# Patient Record
Sex: MALE
[De-identification: names, ages, dates, MRNs, and addresses within clinical notes are randomized per-mention and may not be internally consistent; named-entity substitution may affect disease eponyms.]

---

## 2017-09-06 ENCOUNTER — HOSPITAL ENCOUNTER (OUTPATIENT)
Dept: HOSPITAL 14 - H.ER | Age: 57
Setting detail: OBSERVATION
LOS: 2 days | Discharge: HOME | End: 2017-09-08
Attending: INTERNAL MEDICINE | Admitting: INTERNAL MEDICINE
Payer: COMMERCIAL

## 2017-09-06 DIAGNOSIS — Z85.841: ICD-10-CM

## 2017-09-06 DIAGNOSIS — Z79.899: ICD-10-CM

## 2017-09-06 DIAGNOSIS — G40.909: ICD-10-CM

## 2017-09-06 DIAGNOSIS — I10: ICD-10-CM

## 2017-09-06 DIAGNOSIS — E78.5: ICD-10-CM

## 2017-09-06 DIAGNOSIS — Z90.49: ICD-10-CM

## 2017-09-06 DIAGNOSIS — D49.6: ICD-10-CM

## 2017-09-06 DIAGNOSIS — Z85.038: ICD-10-CM

## 2017-09-06 DIAGNOSIS — C78.7: ICD-10-CM

## 2017-09-06 DIAGNOSIS — K56.5: Primary | ICD-10-CM

## 2017-09-06 LAB
BASOPHILS # BLD AUTO: 0.1 K/UL (ref 0–0.2)
BASOPHILS NFR BLD: 0.7 % (ref 0–2)
EOSINOPHIL # BLD AUTO: 0.1 K/UL (ref 0–0.7)
EOSINOPHIL NFR BLD: 0.7 % (ref 0–4)
ERYTHROCYTE [DISTWIDTH] IN BLOOD BY AUTOMATED COUNT: 17.5 % (ref 11.5–14.5)
HCT VFR BLD CALC: 48 % (ref 35–51)
LYMPHOCYTES # BLD AUTO: 1.2 K/UL (ref 1–4.3)
LYMPHOCYTES NFR BLD AUTO: 9.9 % (ref 20–40)
MCH RBC QN AUTO: 28 PG (ref 27–31)
MCHC RBC AUTO-ENTMCNC: 32.8 G/DL (ref 33–37)
MCV RBC AUTO: 85.3 FL (ref 80–94)
MONOCYTES # BLD: 0.9 K/UL (ref 0–0.8)
MONOCYTES NFR BLD: 7 % (ref 0–10)
NEUTROPHILS # BLD: 10 K/UL (ref 1.8–7)
NEUTROPHILS NFR BLD AUTO: 81.7 % (ref 50–75)
NRBC BLD AUTO-RTO: 0 % (ref 0–0)
PLATELET # BLD: 194 K/UL (ref 130–400)
PMV BLD AUTO: 9.1 FL (ref 7.2–11.7)
WBC # BLD AUTO: 12.3 K/UL (ref 4.8–10.8)

## 2017-09-06 PROCEDURE — 85027 COMPLETE CBC AUTOMATED: CPT

## 2017-09-06 PROCEDURE — 96360 HYDRATION IV INFUSION INIT: CPT

## 2017-09-06 PROCEDURE — 81003 URINALYSIS AUTO W/O SCOPE: CPT

## 2017-09-06 PROCEDURE — 85730 THROMBOPLASTIN TIME PARTIAL: CPT

## 2017-09-06 PROCEDURE — 85025 COMPLETE CBC W/AUTO DIFF WBC: CPT

## 2017-09-06 PROCEDURE — 99285 EMERGENCY DEPT VISIT HI MDM: CPT

## 2017-09-06 PROCEDURE — 74000: CPT

## 2017-09-06 PROCEDURE — 71010: CPT

## 2017-09-06 PROCEDURE — 36415 COLL VENOUS BLD VENIPUNCTURE: CPT

## 2017-09-06 PROCEDURE — 96374 THER/PROPH/DIAG INJ IV PUSH: CPT

## 2017-09-06 PROCEDURE — 96361 HYDRATE IV INFUSION ADD-ON: CPT

## 2017-09-06 PROCEDURE — 74177 CT ABD & PELVIS W/CONTRAST: CPT

## 2017-09-06 PROCEDURE — 83690 ASSAY OF LIPASE: CPT

## 2017-09-06 PROCEDURE — 93005 ELECTROCARDIOGRAM TRACING: CPT

## 2017-09-06 PROCEDURE — 80053 COMPREHEN METABOLIC PANEL: CPT

## 2017-09-06 PROCEDURE — 96376 TX/PRO/DX INJ SAME DRUG ADON: CPT

## 2017-09-06 PROCEDURE — 85610 PROTHROMBIN TIME: CPT

## 2017-09-06 NOTE — ED PDOC
HPI: Abdomen


Time Seen by Provider: 09/06/17 23:25


Chief Complaint (Nursing): Abdominal Pain


Chief Complaint (Provider): Abdominal pain


History Per: Patient


History/Exam Limitations: no limitations


Onset/Duration Of Symptoms: Days (1)


Outside of [place] travel?: No


Current Symptoms Are (Timing): Still Present


Location Of Pain/Discomfort: Diffuse


Quality Of Discomfort: "Pain"


Associated Symptoms: Nausea, Vomiting.  denies: Fever, Chills


Additional History Per: Patient


Additional Complaint(s): 


The patient is a 58yo male, pmhx of colon cancer with colectomy and removal of 

lesions on his liver, both occurring earlier this year, presents to the ED for 

evaluation of diffuse abdominal pain, described as sharp, present for the past 

day. He reports associated nausea and vomiting 1x and states his last bowel 

movement was earlier today. He denies any other medical complaints.





Past Medical History


Reviewed: Historical Data, Nursing Documentation, Vital Signs


Vital Signs: 


 Last Vital Signs











Temp  98.4 F   09/08/17 07:35


 


Pulse  52 L  09/08/17 07:35


 


Resp  20   09/08/17 07:35


 


BP  156/88 H  09/08/17 07:35


 


Pulse Ox  98   09/08/17 07:35














- Medical History


PMH: HTN, Seizures


   Denies: Chronic Kidney Disease





- Surgical History


Other surgeries: colectomy, lesion removal from liver





- Family History


Family History: States: Unknown Family Hx





- Immunization History


Hx Tetanus Toxoid Vaccination: No


Hx Influenza Vaccination: No


Hx Pneumococcal Vaccination: No





- Home Medications


Home Medications: 


 Ambulatory Orders











 Medication  Instructions  Recorded


 


Isosorbide 30 mg PO DAILY 03/24/15


 


Keppra 1,000 mg PO BID 03/24/15


 


Lamictal Xl 300 mg PO DAILY 03/24/15


 


Simvastatin 20 mg PO HS 03/24/15














- Allergies


Allergies/Adverse Reactions: 


 Allergies











Allergy/AdvReac Type Severity Reaction Status Date / Time


 


No Known Allergies Allergy   Verified 09/06/17 23:18














Review of Systems


ROS Statement: Except As Marked, All Systems Reviewed And Found Negative


Constitutional: Negative for: Fever, Chills


Gastrointestinal: Positive for: Nausea, Vomiting (x1), Abdominal Pain (diffuse)





Physical Exam





- Reviewed


Nursing Documentation Reviewed: Yes


Vital Signs Reviewed: Yes





- Physical Exam


Appears: Positive for: Non-toxic, Uncomfortable


Head Exam: Positive for: ATRAUMATIC, NORMAL INSPECTION, NORMOCEPHALIC


Skin: Positive for: Normal Color


Eye Exam: Positive for: Normal appearance


Neck: Positive for: Normal, Supple


Cardiovascular/Chest: Positive for: Regular Rate, Rhythm


Respiratory: Positive for: Normal Breath Sounds.  Negative for: Respiratory 

Distress


Gastrointestinal/Abdominal: Positive for: Soft, Tenderness (diffuse).  Negative 

for: Guarding, Rebound


Back: Positive for: Normal Inspection


Extremity: Positive for: Normal ROM.  Negative for: Deformity, Swelling


Neurologic/Psych: Positive for: Alert, Oriented.  Negative for: Motor/Sensory 

Deficits





- Laboratory Results


Result Diagrams: 


 09/08/17 08:01





 09/06/17 23:47





- ECG


Interpretation Of ECG: 





NSR @ 70, no ST-T changes.


O2 Sat by Pulse Oximetry: 100 (RA)


Pulse Ox Interpretation: Normal





Medical Decision Making


Medical Decision Making: 


Time: 2330


Impression: Abdominal pain


Plan:


* CT AP w. PO Contrast


* Labs


* IV NS 1L @ 1000 ml/hr


* Morphine 4mg IV


* Zofran 4mg IV


* Urinalysis


* Urine culture


* ED Observation





--------------------------------------------------------------------------------

-----------------


Scribe Attestation:


Documented by Roxi Patterson, acting as a scribe for Mala Davis MD.





Provider Scribe Attestation:


All medical record entries made by the Scribe were at my direction and 

personally dictated by me. I have reviewed the chart and agree that the record 

accurately reflects my personal performance of the history, physical exam, 

medical decision making, and the department course for this patient. I have 

also personally directed, reviewed, and agree with the discharge instructions 

and disposition.





ED OBSERVATION


Date of observation admission: 09/06/17


Time of observation admission: 23:30





- Observation admission statement


Patient is being placed in observation because:: 


Pt w/ diffuse abdominal pain





- Goals of Observation


Goals of observation are:: 





Pt awaiting labs, imaging.





- Progress Note


Progress Note: 





09/07/17 00:00


Pt to be signed out to Dr. Askew pending labs, CT. 








Disposition





- Clinical Impression


Clinical Impression: 


 Abdominal pain








- Patient ED Disposition


Is Patient to be Admitted: Transfer of Care





- Disposition


Disposition: Transfer of Care


Disposition Time: 00:00


Condition: STABLE


Patient Signed Over To: Milly Askew


Handoff Comments: pending labs and imaging

## 2017-09-07 LAB
ALBUMIN/GLOB SERPL: 1.2 {RATIO} (ref 1–2.1)
ALP SERPL-CCNC: 106 U/L (ref 38–126)
ALT SERPL-CCNC: 166 U/L (ref 21–72)
APTT BLD: 28.4 SECONDS (ref 25.6–37.1)
AST SERPL-CCNC: 144 U/L (ref 17–59)
BILIRUB SERPL-MCNC: 1 MG/DL (ref 0.2–1.3)
BILIRUB UR-MCNC: NEGATIVE MG/DL
BUN SERPL-MCNC: 13 MG/DL (ref 9–20)
CALCIUM SERPL-MCNC: 9.9 MG/DL (ref 8.4–10.2)
CHLORIDE SERPL-SCNC: 100 MMOL/L (ref 98–107)
CO2 SERPL-SCNC: 21 MMOL/L (ref 22–30)
COLOR UR: YELLOW
GLOBULIN SER-MCNC: 4.1 GM/DL (ref 2.2–3.9)
GLUCOSE SERPL-MCNC: 153 MG/DL (ref 75–110)
GLUCOSE UR STRIP-MCNC: (no result) MG/DL
KETONES UR STRIP-MCNC: NEGATIVE MG/DL
LEUKOCYTE ESTERASE UR-ACNC: (no result) LEU/UL
LIPASE SERPL-CCNC: 241 U/L (ref 23–300)
NEUTROPHILS NFR BLD AUTO: 84 % (ref 42–75)
PH UR STRIP: 6 [PH] (ref 5–8)
POTASSIUM SERPL-SCNC: 4.4 MMOL/L (ref 3.6–5)
PROT SERPL-MCNC: 8.8 G/DL (ref 6.3–8.2)
PROT UR STRIP-MCNC: 30 MG/DL
RBC # UR STRIP: (no result) /UL
RBC #/AREA URNS HPF: 8 /HPF (ref 0–3)
SODIUM SERPL-SCNC: 135 MMOL/L (ref 132–148)
SP GR UR STRIP: 1.05 (ref 1–1.03)
TOTAL CELLS COUNTED BLD: 100
UROBILINOGEN UR-MCNC: 2 MG/DL (ref 0.2–1)
VARIANT LYMPHS NFR BLD MANUAL: 1 % (ref 0–0)
WBC #/AREA URNS HPF: < 1 /HPF (ref 0–5)

## 2017-09-07 RX ADMIN — DEXTROSE AND SODIUM CHLORIDE SCH MLS/HR: 5; 450 INJECTION, SOLUTION INTRAVENOUS at 11:35

## 2017-09-07 RX ADMIN — DEXTROSE AND SODIUM CHLORIDE SCH MLS/HR: 5; 450 INJECTION, SOLUTION INTRAVENOUS at 23:20

## 2017-09-07 NOTE — RAD
HISTORY:

Abd pain  



COMPARISON:

Portable chest 03/13/2013. 



FINDINGS:



LUNGS:

No active pulmonary disease.



PLEURA:

Resolution of prior left sided interstitial pulmonary changes. No 

acute infiltrate bilaterally.



CARDIOVASCULAR:

Normal.



OSSEOUS STRUCTURES:

No significant abnormalities.



VISUALIZED UPPER ABDOMEN:

Ventricles is seen at the right subdiaphragmatic space or right lung 

base



OTHER FINDINGS:

None.



IMPRESSION:

No acute cardiopulmonary superior resolution prior interstitial 

changes left lung.  Interval surgical clips right upper quadrant 

abdomen or possibly right lung base posteriorly.

## 2017-09-07 NOTE — HP
HISTORY OF PRESENT ILLNESS:  Mr. Ibarra is a 57-year-old male who was

admitted to the emergency room because of abdominal pain with nausea and

vomiting x1 on the night of admission.  He indicated that he had moved his

bowel and was sitting in the emergency room.  Had a CAT scan of the abdomen

done that showed partial lymphatic obstruction.  He was therefore admitted

for workup and therapy.



PAST MEDICAL HISTORY:  He has a past medical history of cancer of the colon

status post colectomy about 1 year ago.  He also has history of brain

tumor, status post resection which resolved and seizure disorder.  He also

has a history of hypertension.



FAMILY HISTORY:  Unremarkable.



SOCIAL HISTORY:  He does not smoke or drink and lives at home with wife.



REVIEW OF SYSTEMS:  Essentially unremarkable.



PHYSICAL EXAMINATION:

GENERAL:  The patient is alert and oriented, appears much more comfortable

since admission just moved his bowels this morning.

VITAL SIGNS:  Blood pressure 130/78 with a pulse of 60, respiratory rate 20

per minutes.  He is afebrile.  O2 sat is 97% on room air.

SKIN:  Shows fair turgor.

HEENT:  Pupils are equal and reactive to light and accommodation. 

Craniotomy scar is noted.  Mouth shows fair hygiene.  JVP is flat.

LUNGS:  Clear.

HEART:  Regular.  No murmurs, rub or gallops.

ABDOMEN:  Distended, but nontender.  Scar of prior surgery noted.  There is

normoactive bowel sounds.  No organomegaly appreciated.

EXTREMITIES:  Shows no edema or cyanosis.

GENITALIA AND RECTAL:  Within normal limits.



LABORATORY DATA:  Remarkable for WBC of 12.5, hemoglobin 15.7, platelet

count of 197,000.  Sodium 135, potassium 4.4, BUN of 13, creatinine 0.7,

, , lipase 241.  Chest x-ray official report pending.  CAT

scan of the abdomen and pelvis is remarkable for early distal small bowel

obstruction, vessels possible developing ileus, partial right hepatectomy

surgery as well as cholecystectomy with marked diffuse fatty infiltration.

Trace ascites.  EKG is remarkable for normal sinus rhythm moderate voltage

criteria for LVH.



IMPRESSION:  Partial small bowel obstruction, history of colon cancer

status post resection, and hepatectomy partial, hypertension, history of

seizures, history of brain cancer in the past.



PLAN:  IV hydration, analgesics for pain, would give IV Protonix,

gastroenterology, and surgical evaluation will be requested.  Begin clear

liquids by mouth and advance gradually as tolerated since the patient has

moved his bowels.  Further therapy would depend on findings.





__________________________________________

Rei Zimmerman MD



DD:  09/07/2017 9:05:39

DT:  09/07/2017 9:46:38

Job # [id number]

## 2017-09-07 NOTE — CP.PCM.CON
History of Present Illness





- History of Present Illness


History of Present Illness: 





57 y.o. male comes to the hospital c/o abdominal pain for 1 day duration.  

Patient states that he had one episode of non bloody vomiting.  Denies any 

fever or chills at home.  Was passing flatus and had a bowel movements 

yesterday and 1 today that is diarrhea as per patient.  NO urinary symptoms, no 

sick contacts at home.  No other complains at present time.





Review of Systems





- Constitutional


Constitutional: As Per HPI





- EENT


Eyes: Other (unremarkable)


Ears: Other (unremarkable)


Nose/Mouth/Throat: Other (unremarkable)





- Cardiovascular


Cardiovascular: Other (unremarkable)





- Respiratory


Respiratory: Other (unremarkable)





- Gastrointestinal


Gastrointestinal: As Per HPI





- Genitourinary


Genitourinary: As Per HPI





- Reproductive: Male


Reproductive:Male: Other (unremarkable)





- Integumentary


Integumentary: Other (unremarkable)





- Neurological


Neurological: Other (unremarkable)





- Psychiatric


Psychiatric: Other (unremarkable)





- Endocrine


Endocrine: Other (unremarkable)





- Hematologic/Lymphatic


Hematologic: Other (unremarkable)





Past Patient History





- Past Medical History & Family History


Past Medical History?: Yes





- Past Social History


Smoking Status: Never Smoked





- CARDIAC


Hx Cardiac Disorders: Yes (htn,hld)





- PULMONARY


Hx Respiratory Disorders: No





- NEUROLOGICAL


Hx Neurological Disorder: Yes (seizure)





- HEENT


Hx HEENT Problems: No





- RENAL


Hx Chronic Kidney Disease: No





- ENDOCRINE/METABOLIC


Hx Endocrine Disorders: No





- HEMATOLOGICAL/ONCOLOGICAL


Hx Blood Disorders: No


Hx Cancer: Yes (colon cancer with liver metastasis)





- INTEGUMENTARY


Hx Dermatological Problems: No





- MUSCULOSKELETAL/RHEUMATOLOGICAL


Hx Musculoskeletal Disorders: No


Hx Falls: Yes (hx falls due to seizures)





- GASTROINTESTINAL


Hx Gastrointestinal Disorders: No


Hx Bowel Surgery: Yes (sigmoid colectomy)





- GENITOURINARY/GYNECOLOGICAL


Hx Genitourinary Disorders: No





- PSYCHIATRIC


Hx Psychophysiologic Disorder: No





- SURGICAL HISTORY


Hx Surgeries: Yes


Other/Comment: craniotomy- March 2014, sigmoid colectomy and right hepatectomy





- ANESTHESIA


Hx Anesthesia: Yes


Hx Anesthesia Reactions: No


Hx Malignant Hyperthermia: No





Meds


Allergies/Adverse Reactions: 


 Allergies











Allergy/AdvReac Type Severity Reaction Status Date / Time


 


No Known Allergies Allergy   Verified 09/06/17 23:18














- Medications


Medications: 


 Current Medications





Atorvastatin Calcium (Lipitor)  20 mg PO DAILY [facility]


Hydromorphone HCl (Dilaudid)  1 mg IVP Q6 PRN


   PRN Reason: Pain, moderate (4-7)


Sodium Chloride (Sodium Chloride 0.9%)  1,000 mls @ 125 mls/hr IV .Q8H [facility]


   Stop: 09/07/17 13:09


   Last Admin: 09/07/17 07:39 Dose:  125 mls/hr


Dextrose/Sodium Chloride (Dextrose 5%/0.45% Ns 1000 Ml)  1,000 mls @ 80 mls/hr 

IV .[id number] [facility]


   Stop: 09/08/17 09:07


Isosorbide Mononitrate (Imdur Er)  30 mg PO DAILY [facility]


Lamotrigine (Lamictal)  300 mg PO DAILY [facility]


Levetiracetam (Keppra)  1,000 mg PO BID [facility]











Physical Exam





- Constitutional


Appears: Well, Non-toxic, No Acute Distress





- Head Exam


Head Exam: ATRAUMATIC, NORMAL INSPECTION, NORMOCEPHALIC





- Eye Exam


Eye Exam: EOMI, Normal appearance, PERRL


Pupil Exam: NORMAL ACCOMODATION, PERRL





- ENT Exam


ENT Exam: Mucous Membranes Moist, Normal Exam





- Neck Exam


Neck exam: Positive for: Full Rom, Normal Inspection





- Respiratory Exam


Respiratory Exam: Clear to Auscultation Bilateral, NORMAL BREATHING PATTERN





- Cardiovascular Exam


Cardiovascular Exam: REGULAR RHYTHM, +S1, +S2





- GI/Abdominal Exam


GI & Abdominal Exam: Normal Bowel Sounds, Soft


Additional comments: 





NT, ND, BS+, no rebound, no guarding, well healed scars from prior surgeries





- Rectal Exam


Rectal Exam: Deferred





- Extremities Exam


Extremities exam: Positive for: full ROM, normal inspection





- Back Exam


Back exam: NORMAL INSPECTION





- Neurological Exam


Neurological exam: Alert, CN II-XII Intact, Oriented x3





- Psychiatric Exam


Psychiatric exam: Normal Affect, Normal Mood





- Skin


Skin Exam: Dry, Intact, Normal Color, Warm





Results





- Vital Signs


Recent Vital Signs: 


 Last Vital Signs











Temp  98 F   09/07/17 08:30


 


Pulse  55 L  09/07/17 09:43


 


Resp  18   09/07/17 09:43


 


BP  155/74 H  09/07/17 08:30


 


Pulse Ox  98   09/07/17 08:30














- Labs


Result Diagrams: 


 09/06/17 23:47





 09/06/17 23:47


Labs: 


 Laboratory Results - last 24 hr











  09/07/17





  07:52


 


Urine Color  Yellow


 


Urine Clarity  Clear


 


Urine pH  6.0


 


Ur Specific Gravity  1.049 H


 


Urine Protein  30


 


Urine Glucose (UA)  Neg


 


Urine Ketones  Negative


 


Urine Blood  Small


 


Urine Nitrate  Negative


 


Urine Bilirubin  Negative


 


Urine Urobilinogen  2.0


 


Ur Leukocyte Esterase  Neg


 


Urine RBC (Auto)  8 H


 


Urine Microscopic WBC  < 1














- Imaging and Cardiology


  ** CT scan - abdomen


Status: Image reviewed by me, Report reviewed by me





Assessment & Plan





- Assessment and Plan (Free Text)


Assessment: 





57 y.o. male with resolving small bowel obstruction


Plan: 





- Clear liquid diet


- pain control


- Zofran prn


- IV fluids


- Repeat labs in am


- Out of bed and ambulate


- Will follow

## 2017-09-07 NOTE — CP.PCM.CON
History of Present Illness





- History of Present Illness


History of Present Illness: 





58 yo male admitted with abdominal pain beginning the day before admission.  He 

had colon cancer approximately one year ago requiring segmental sigmoid 

resection and hepatectomy for liver mets. CT suggets possible bowel obstruction 

. Since arriving to the floor, has had a couple of bowel movements.  





Review of Systems





- Constitutional


Constitutional: absent: Chills





- EENT


Eyes: absent: Blurred Vision


Ears: absent: Decreased Hearing


Nose/Mouth/Throat: absent: Epistaxis





- Cardiovascular


Cardiovascular: absent: Chest Pain





- Respiratory


Respiratory: absent: Cough





- Gastrointestinal


Gastrointestinal: As Per HPI





- Genitourinary


Genitourinary: absent: Change in Urinary Stream





Past Patient History





- Past Medical History & Family History


Past Medical History?: Yes





- Past Social History


Smoking Status: Never Smoked





- CARDIAC


Hx Cardiac Disorders: Yes (htn,hld)





- PULMONARY


Hx Respiratory Disorders: No





- NEUROLOGICAL


Hx Neurological Disorder: Yes (seizure)





- HEENT


Hx HEENT Problems: No





- RENAL


Hx Chronic Kidney Disease: No





- ENDOCRINE/METABOLIC


Hx Endocrine Disorders: No





- HEMATOLOGICAL/ONCOLOGICAL


Hx Blood Disorders: No


Hx Cancer: Yes (colon cancer with liver metastasis)





- INTEGUMENTARY


Hx Dermatological Problems: No





- MUSCULOSKELETAL/RHEUMATOLOGICAL


Hx Musculoskeletal Disorders: No


Hx Falls: Yes (hx falls due to seizures)





- GASTROINTESTINAL


Hx Gastrointestinal Disorders: No


Hx Bowel Surgery: Yes (sigmoid colectomy)





- GENITOURINARY/GYNECOLOGICAL


Hx Genitourinary Disorders: No





- PSYCHIATRIC


Hx Psychophysiologic Disorder: No





- SURGICAL HISTORY


Hx Surgeries: Yes


Other/Comment: craniotomy- March 2014, sigmoid colectomy and right hepatectomy





- ANESTHESIA


Hx Anesthesia: Yes


Hx Anesthesia Reactions: No


Hx Malignant Hyperthermia: No





Meds


Allergies/Adverse Reactions: 


 Allergies











Allergy/AdvReac Type Severity Reaction Status Date / Time


 


No Known Allergies Allergy   Verified 09/06/17 23:18














- Medications


Medications: 


 Current Medications





Atorvastatin Calcium (Lipitor)  20 mg PO DAILY@1700 [facility]


   Last Admin: 09/07/17 16:33 Dose:  20 mg


Hydromorphone HCl (Dilaudid)  1 mg IVP Q6 PRN


   PRN Reason: Pain, moderate (4-7)


Dextrose/Sodium Chloride (Dextrose 5%/0.45% Ns 1000 Ml)  1,000 mls @ 80 mls/hr 

IV .[id number] [facility]


   Stop: 09/08/17 09:07


   Last Admin: 09/07/17 11:35 Dose:  80 mls/hr


Isosorbide Mononitrate (Imdur Er)  30 mg PO DAILY [facility]


   Last Admin: 09/07/17 11:20 Dose:  30 mg


Lamotrigine (Lamictal)  300 mg PO DAILY@1700 [facility]


   Last Admin: 09/07/17 16:33 Dose:  300 mg


Levetiracetam (Keppra)  1,000 mg PO BID [facility]


   Last Admin: 09/07/17 16:33 Dose:  1,000 mg











Physical Exam





- Head Exam


Head Exam: ATRAUMATIC





- Eye Exam


Eye Exam: EOMI





- ENT Exam


ENT Exam: Mucous Membranes Moist





- Neck Exam


Neck exam: Positive for: Normal Inspection





- Respiratory Exam


Respiratory Exam: Clear to Auscultation Bilateral





- Cardiovascular Exam


Cardiovascular Exam: REGULAR RHYTHM, +S1, +S2





- GI/Abdominal Exam


GI & Abdominal Exam: Normal Bowel Sounds, Soft.  absent: Tenderness


Additional comments: 





Large circular abdominal scar





Results





- Vital Signs


Recent Vital Signs: 


 Last Vital Signs











Temp  97.8 F   09/07/17 16:24


 


Pulse  56 L  09/07/17 16:04


 


Resp  18   09/07/17 16:04


 


BP  146/78   09/07/17 16:04


 


Pulse Ox  99   09/07/17 16:04














- Labs


Result Diagrams: 


 09/06/17 23:47





 09/06/17 23:47


Labs: 


 Laboratory Results - last 24 hr











  09/07/17





  07:52


 


Urine Color  Yellow


 


Urine Clarity  Clear


 


Urine pH  6.0


 


Ur Specific Gravity  1.049 H


 


Urine Protein  30


 


Urine Glucose (UA)  Neg


 


Urine Ketones  Negative


 


Urine Blood  Small


 


Urine Nitrate  Negative


 


Urine Bilirubin  Negative


 


Urine Urobilinogen  2.0


 


Ur Leukocyte Esterase  Neg


 


Urine RBC (Auto)  8 H


 


Urine Microscopic WBC  < 1














Assessment & Plan


(1) Bowel obstruction


Assessment and Plan: 


CT c/w partial obstruction. Having bowel movements now and tolerating liquids. 

Has had previous major abdominal operations and likely has adhesions. Will 

check KUB tomorrow.


Status: Acute

## 2017-09-07 NOTE — ED PDOC
- Laboratory Results


Result Diagrams: 


 09/08/17 08:01





 09/06/17 23:47





- ECG


O2 Sat by Pulse Oximetry: 100 (RA)


Pulse Ox Interpretation: Normal





Medical Decision Making


Medical Decision Making: 


Receiving Sign Out:


Pt signed out to me by Dr. Davis pending CT and labs,





Scribe Attestation:


Documented by Roxi Patterson, acting as a scribe for Milly Askew MD.





Provider Scribe Attestation:


All medical record entries made by the Scribe were at my direction and 

personally dictated by me. I have reviewed the chart and agree that the record 

accurately reflects my personal performance of the history, physical exam, 

medical decision making, and the department course for this patient. I have 

also personally directed, reviewed, and agree with the discharge instructions 

and disposition.





Disposition





- Clinical Impression


Clinical Impression: 


 Abdominal pain, SBO (small bowel obstruction)








- POA


Present On Arrival: None





- Disposition


Disposition: Admitted as In-Patient


Disposition Time: 14:40


Condition: STABLE





ED OBSERVATION


Date of observation admission: 09/06/17


Time of observation admission: 23:30





- Observation admission statement


Patient is being placed in observation because:: 


Pt with abdominal pain.





- Goals of Observation


Goals of observation are:: 


Pt awaiting CT AP w/ PO and IV contrast.





- Progress Note


Progress Note: 





09/07/17 00:00


Pt signed out to me by Dr. Davis pending imaging studies.





09/07/17 01:30


Pt resting in room, no acute distress.





09/07/17 03:02


Pt currently getting CT scan.





09/07/17 04:09


CT AP IMPRESSION:


1. Fatty infiltration of the liver with suggestion of prior partial hepatectomy 

and cholecystectomy.


2. Suggestion of small bowel obstruction. There appears to be a tapering 

transition in the right upper


quadrant adjacent to the liver. Minimal fluid is noted in the area.


3. Distal sigmoid anastomosis consistent with partial sigmoid resection.





09/07/17 05:35


Pt resting in room, vitals stable.





09/07/17 06:01


Case discussed with Dr. Zimmerman; pt to be admitted to med/surg under Dr. Zimmerman 

for small bowel obstruction.


Call placed to Dr. Alegria, general surgeon on call.





09/07/17 06:14


Case discussed with Dr. Alegria, who is aware and agrees with plan.


Call placed to surgical resident on call.





09/07/17 06:18


Case discussed with surgical resident on call who is aware and will evaluate 

patient at bedside.

## 2017-09-07 NOTE — CT
PROCEDURE:  CT Abdomen and Pelvis with contrast



HISTORY:

Gen abd pain, vomiting, h/o colon CA and resection



COMPARISON:

None.



TECHNIQUE:

Contrast dose: Omnipaque 300, 95 cc



Radiation dose:



Total exam DLP = 1131 mGy-cm.



This CT exam was performed using one or more of the following dose 

reduction techniques: Automated exposure control, adjustment of the 

mA and/or kV according to patient size, and/or use of iterative 

reconstruction technique.



FINDINGS:



LOWER THORAX:

Unremarkable. 



LIVER:

There is marked diffuse fatty infiltration of the liver in this which 

appears presents with prior right hepatectomy. 



GALLBLADDER AND BILE DUCTS:

Likely surgically absent gallbladder.  Clinically correlate. 



PANCREAS:

Unremarkable. No gross lesion or ductal dilatation.



SPLEEN:

Unremarkable. 



ADRENALS:

Unremarkable. No mass. 



KIDNEYS AND URETERS:

Unremarkable. No hydronephrosis. No solid mass. 



VASCULATURE:

Unremarkable. No aortic aneurysm. 



BOWEL:

Borderline bowel obstruction pattern is appreciably distended small 

bowel is appreciated up to the right flank/right upper quadrant 

lateral to the remaining liver segments. Transition point is not 

definitive but maybe at the right upper quadrant see 70 through 102, 

series 3. Stomach is distended with retained oral contrast material. 

The large bowel is not collapsed and the overall pattern may reflect 

an ileus or developing distal small bowel obstruction. Liquified 

fecal material and gas mildly distend the ascending colon and 

transverse segment proximally. Prior segmental resection of the 

distal sigmoid colon is a pair with anastomosis appearing 

unremarkable grossly.



APPENDIX:

Normal appendix. 



PERITONEUM:

Limited ascites seen the right upper quadrant as well as the right 

hemipelvis PE 



LYMPH NODES:

Unremarkable. No enlarged lymph nodes. 



BLADDER:

Unremarkable. 



REPRODUCTIVE:

Unremarkable. 



BONES:

No acute fracture. 



OTHER FINDINGS:

None.



IMPRESSION:





1. Early distal small bowel obstruction versus possible developing 

ileus. 



2. Partial right hepatectomy suggests as well as cholecystectomy with 

marked diffuse fatty infiltration are identified in the remaining 

hepatic parenchyma without discrete mass evident. 



3. Trace ascites. 



[facility] preliminary report submitted 09/07/2017.

## 2017-09-08 VITALS — SYSTOLIC BLOOD PRESSURE: 156 MMHG | DIASTOLIC BLOOD PRESSURE: 88 MMHG | HEART RATE: 52 BPM | TEMPERATURE: 98.4 F

## 2017-09-08 VITALS — RESPIRATION RATE: 20 BRPM

## 2017-09-08 VITALS — OXYGEN SATURATION: 100 %

## 2017-09-08 LAB
ERYTHROCYTE [DISTWIDTH] IN BLOOD BY AUTOMATED COUNT: 17.4 % (ref 11.5–14.5)
HCT VFR BLD CALC: 44.2 % (ref 35–51)
MCH RBC QN AUTO: 28.2 PG (ref 27–31)
MCHC RBC AUTO-ENTMCNC: 33 G/DL (ref 33–37)
MCV RBC AUTO: 85.5 FL (ref 80–94)
PLATELET # BLD: 151 K/UL (ref 130–400)
WBC # BLD AUTO: 6.2 K/UL (ref 4.8–10.8)

## 2017-09-08 NOTE — CP.PCM.DIS
Provider





- Provider


Date of Admission: 


09/07/17 06:02





Attending physician: 


Rei Cervantes MD





Primary care physician: 


Rei Cervantes MD





Time Spent in preparation of Discharge (in minutes): 30





Diagnosis





- Discharge Diagnosis


(1) Hypertension


Status: Acute   





(2) Abdominal pain


Status: Acute   





(3) Bowel obstruction


Status: Acute   





(4) Seizure


Status: Acute   





Hospital Course





- Lab Results


Lab Results: 


 Most Recent Lab Values











WBC  6.2 K/uL (4.8-10.8)   09/08/17  08:01    


 


RBC  5.17 Mil/uL (4.40-5.90)   09/08/17  08:01    


 


Hgb  14.6 g/dL (12.0-18.0)   09/08/17  08:01    


 


Hct  44.2 % (35.0-51.0)   09/08/17  08:01    


 


MCV  85.5 fl (80.0-94.0)   09/08/17  08:01    


 


MCH  28.2 pg (27.0-31.0)   09/08/17  08:01    


 


MCHC  33.0 g/dL (33.0-37.0)   09/08/17  08:01    


 


RDW  17.4 % (11.5-14.5)  H  09/08/17  08:01    


 


Plt Count  151 K/uL (130-400)   09/08/17  08:01    


 


MPV  9.1 fl (7.2-11.7)   09/06/17  23:47    


 


Neut % (Auto)  81.7 % (50.0-75.0)  H  09/06/17  23:47    


 


Lymph % (Auto)  9.9 % (20.0-40.0)  L  09/06/17  23:47    


 


Mono % (Auto)  7.0 % (0.0-10.0)   09/06/17  23:47    


 


Eos % (Auto)  0.7 % (0.0-4.0)   09/06/17  23:47    


 


Baso % (Auto)  0.7 % (0.0-2.0)   09/06/17  23:47    


 


Neut #  10.0 K/uL (1.8-7.0)  H  09/06/17  23:47    


 


Lymph #  1.2 K/uL (1.0-4.3)   09/06/17  23:47    


 


Mono #  0.9 K/uL (0.0-0.8)  H  09/06/17  23:47    


 


Eos #  0.1 K/uL (0.0-0.7)   09/06/17  23:47    


 


Baso #  0.1 K/uL (0.0-0.2)   09/06/17  23:47    


 


Neutrophils % (Manual)  84 % (42-75)  H  09/06/17  23:47    


 


Lymphocytes % (Manual)  7 % (20-50)  L  09/06/17  23:47    


 


Reactive Lymphs %  1 % (0-0)  H  09/06/17  23:47    


 


Monocytes % (Manual)  8 % (0-10)   09/06/17  23:47    


 


Platelet Estimate  Normal  (NORMAL)   09/06/17  23:47    


 


PT  11.1 Seconds (9.8-13.1)   09/06/17  23:47    


 


INR  1.1  (0.9-1.2)   09/06/17  23:47    


 


APTT  28.4 Seconds (25.6-37.1)   09/06/17  23:47    


 


Sodium  135 mmol/l (132-148)   09/06/17  23:47    


 


Potassium  4.4 MMOL/L (3.6-5.0)   09/06/17  23:47    


 


Chloride  100 mmol/L ()   09/06/17  23:47    


 


Carbon Dioxide  21 mmol/L (22-30)  L  09/06/17  23:47    


 


Anion Gap  18  (10-20)   09/06/17  23:47    


 


BUN  13 mg/dl (9-20)   09/06/17  23:47    


 


Creatinine  0.7 mg/dL (0.8-1.5)  L  09/06/17  23:47    


 


Est GFR ( Amer)  > 60   09/06/17  23:47    


 


Est GFR (Non-Af Amer)  > 60   09/06/17  23:47    


 


Random Glucose  153 mg/dL ()  H  09/06/17  23:47    


 


Calcium  9.9 mg/dL (8.4-10.2)   09/06/17  23:47    


 


Total Bilirubin  1.0 mg/dl (0.2-1.3)   09/06/17  23:47    


 


AST  144 U/L (17-59)  H  09/06/17  23:47    


 


ALT  166 U/L (21-72)  H D 09/06/17  23:47    


 


Alkaline Phosphatase  106 U/L ()   09/06/17  23:47    


 


Total Protein  8.8 G/DL (6.3-8.2)  H  09/06/17  23:47    


 


Albumin  4.8 g/dL (3.5-5.0)   09/06/17  23:47    


 


Globulin  4.1 gm/dL (2.2-3.9)  H  09/06/17  23:47    


 


Albumin/Globulin Ratio  1.2  (1.0-2.1)   09/06/17  23:47    


 


Lipase  241 U/L ()   09/06/17  23:47    


 


Urine Color  Yellow  (YELLOW)   09/07/17  07:52    


 


Urine Clarity  Clear  (Clear)   09/07/17  07:52    


 


Urine pH  6.0  (5.0-8.0)   09/07/17  07:52    


 


Ur Specific Gravity  1.049  (1.003-1.030)  H  09/07/17  07:52    


 


Urine Protein  30 mg/dL (NEGATIVE)   09/07/17  07:52    


 


Urine Glucose (UA)  Neg mg/dL (Normal)   09/07/17  07:52    


 


Urine Ketones  Negative mg/dL (NEGATIVE)   09/07/17  07:52    


 


Urine Blood  Small  (NEGATIVE)   09/07/17  07:52    


 


Urine Nitrate  Negative  (NEGATIVE)   09/07/17  07:52    


 


Urine Bilirubin  Negative  (NEGATIVE)   09/07/17  07:52    


 


Urine Urobilinogen  2.0 mg/dL (0.2-1.0)   09/07/17  07:52    


 


Ur Leukocyte Esterase  Neg Wiliam/uL (Negative)   09/07/17  07:52    


 


Urine RBC (Auto)  8 /hpf (0-3)  H  09/07/17  07:52    


 


Urine Microscopic WBC  < 1 /hpf (0-5)   09/07/17  07:52    














- Hospital Course


Hospital Course: 





abdominal pain resolved


pt moved his bowels several times





Discharge Exam





- Head Exam


Head Exam: ATRAUMATIC, NORMAL INSPECTION, NORMOCEPHALIC





- Eye Exam


Eye Exam: EOMI, Normal appearance, PERRL


Pupil Exam: NORMAL ACCOMODATION, PERRL





- GI/Abdominal Exam


GI & Abdominal Exam: Normal Bowel Sounds





- Rectal Exam


Rectal Exam: NORMAL INSPECTION





- Neurological Exam


Neurological exam: Alert, CN II-XII Intact, Normal Gait, Oriented x3, Reflexes 

Normal





- Psychiatric Exam


Psychiatric exam: Normal Affect, Normal Mood





- Skin


Skin Exam: Dry, Intact, Normal Color, Warm





Discharge Plan





- Follow Up Plan


Condition: STABLE


Disposition: HOME/ ROUTINE


Patient education suggested?: Yes


Instructions:  How To Wash Your Hands (GEN), Bowel Obstruction (GEN)


Additional Instructions: 


discharge today


follow up with dr cervantes


Referrals: 


Rei Cervantes MD [Primary Care Provider] -

## 2017-09-08 NOTE — RAD
HISTORY:

F/u SBO  



COMPARISON:

No prior.



FINDINGS:



BOWEL:

The bowel gas pattern is nonspecific. No obstruction. 



BONES:

Normal.



OTHER FINDINGS:

There are multiple surgical clips in the right upper quadrant.



IMPRESSION:

Nonobstructive bowel-gas pattern.

## 2017-09-08 NOTE — CP.PCM.PN
<KarthikElina-Santiago - Last Filed: 09/08/17 08:22>





Subjective





- Date & Time of Evaluation


Date of Evaluation: 09/08/17


Time of Evaluation: 08:22





- Subjective


Subjective: 





Surgery: Dr. Alegria 





Patient doing well. Report flatus. Denies n/v abdominal pain. Tolerated clears. 





Objective





- Vital Signs/Intake and Output


Vital Signs (last 24 hours): 


 











Temp Pulse Resp BP Pulse Ox


 


 98.4 F   52 L  20   156/88 H  98 


 


 09/08/17 07:35  09/08/17 07:35  09/08/17 07:35  09/08/17 07:35  09/08/17 07:35











- Medications


Medications: 


 Current Medications





Atorvastatin Calcium (Lipitor)  20 mg PO DAILY@1700 [facility]


   Last Admin: 09/07/17 16:33 Dose:  20 mg


Hydromorphone HCl (Dilaudid)  1 mg IVP Q6 PRN


   PRN Reason: Pain, moderate (4-7)


Dextrose/Sodium Chloride (Dextrose 5%/0.45% Ns 1000 Ml)  1,000 mls @ 80 mls/hr 

IV .[id number] [facility]


   Stop: 09/08/17 09:07


   Last Admin: 09/07/17 23:20 Dose:  80 mls/hr


Isosorbide Mononitrate (Imdur Er)  30 mg PO DAILY [facility]


   Last Admin: 09/07/17 11:20 Dose:  30 mg


Lamotrigine (Lamictal)  300 mg PO DAILY@1700 [facility]


   Last Admin: 09/07/17 16:33 Dose:  300 mg


Levetiracetam (Keppra)  1,000 mg PO BID [facility]


   Last Admin: 09/07/17 16:33 Dose:  1,000 mg











- Labs


Labs: 


 











PT  11.1 Seconds (9.8-13.1)   09/06/17  23:47    


 


INR  1.1  (0.9-1.2)   09/06/17  23:47    


 


APTT  28.4 Seconds (25.6-37.1)   09/06/17  23:47    














- Constitutional


Appears: Well, Non-toxic, No Acute Distress





- Head Exam


Head Exam: ATRAUMATIC, NORMOCEPHALIC





- Eye Exam


Eye Exam: EOMI, Normal appearance





- ENT Exam


ENT Exam: Mucous Membranes Moist





- Respiratory Exam


Respiratory Exam: NORMAL BREATHING PATTERN.  absent: Respiratory Distress





- Cardiovascular Exam


Cardiovascular Exam: REGULAR RHYTHM.  absent: Tachycardia





- GI/Abdominal Exam


GI & Abdominal Exam: Soft.  absent: Distended, Guarding, Tenderness, Rebound





- Neurological Exam


Neurological Exam: Alert, Awake, Oriented x3





- Psychiatric Exam


Psychiatric exam: Normal Affect, Normal Mood





Assessment and Plan





- Assessment and Plan (Free Text)


Assessment: 





58 y/o male w/ abdominal pain n/v-resolved


Plan: 





-ok to advance to Full liquid diet then regular if tolerates 


-encourage ambulation 


-monitor for pain or vomiting


-if diet tolerated, cleared for d/c from surgical standpoint this afternoon 


-d/w Dr. Raji PINKkuldip PGY3





<Duncan Alegria - Last Filed: 09/08/17 10:12>





Subjective





- Date & Time of Evaluation


Time of Evaluation: 10:00





- Subjective


Subjective: 





Patient was seen and examined at the bedside.  Agree with resident's note above.





Objective





- Vital Signs/Intake and Output


Vital Signs (last 24 hours): 


 











Temp Pulse Resp BP Pulse Ox


 


 98.4 F   52 L  20   156/88 H  98 


 


 09/08/17 07:35  09/08/17 07:35  09/08/17 07:35  09/08/17 07:35  09/08/17 07:35











- Medications


Medications: 


 Current Medications





Atorvastatin Calcium (Lipitor)  20 mg PO DAILY@1700 [facility]


   Last Admin: 09/07/17 16:33 Dose:  20 mg


Hydromorphone HCl (Dilaudid)  1 mg IVP Q6 PRN


   PRN Reason: Pain, moderate (4-7)


Isosorbide Mononitrate (Imdur Er)  30 mg PO DAILY [facility]


   Last Admin: 09/08/17 09:49 Dose:  30 mg


Lamotrigine (Lamictal)  300 mg PO DAILY@1700 [facility]


   Last Admin: 09/07/17 16:33 Dose:  300 mg


Levetiracetam (Keppra)  1,000 mg PO BID [facility]


   Last Admin: 09/08/17 09:49 Dose:  1,000 mg











- Labs


Labs: 


 





 09/08/17 08:01 





 











PT  11.1 Seconds (9.8-13.1)   09/06/17  23:47    


 


INR  1.1  (0.9-1.2)   09/06/17  23:47    


 


APTT  28.4 Seconds (25.6-37.1)   09/06/17  23:47    














Assessment and Plan





- Assessment and Plan (Free Text)


Plan: 





- Regular diet


- Clear for discharge home from the surgical stand point

## 2017-09-08 NOTE — CARD
--------------- APPROVED REPORT --------------





EKG Measurement

Heart [id number]

OR [id number]

[id number]

[id number]

[id number]



<Conclusion>

Normal sinus rhythm

Moderate voltage criteria for LVH, may be normal variant

Borderline ECG

## 2017-09-08 NOTE — CP.PCM.PN
Subjective





- Date & Time of Evaluation


Date of Evaluation: 09/08/17


Time of Evaluation: 09:16





- Subjective


Subjective: 





Patient with 7 bowel movements yesterday. No abdominal complaints.





Objective





- Vital Signs/Intake and Output


Vital Signs (last 24 hours): 


 











Temp Pulse Resp BP Pulse Ox


 


 98.4 F   52 L  20   156/88 H  98 


 


 09/08/17 07:35  09/08/17 07:35  09/08/17 07:35  09/08/17 07:35  09/08/17 07:35











- Medications


Medications: 


 Current Medications





Atorvastatin Calcium (Lipitor)  20 mg PO DAILY@1700 [facility]


   Last Admin: 09/07/17 16:33 Dose:  20 mg


Hydromorphone HCl (Dilaudid)  1 mg IVP Q6 PRN


   PRN Reason: Pain, moderate (4-7)


Isosorbide Mononitrate (Imdur Er)  30 mg PO DAILY [facility]


   Last Admin: 09/07/17 11:20 Dose:  30 mg


Lamotrigine (Lamictal)  300 mg PO DAILY@1700 [facility]


   Last Admin: 09/07/17 16:33 Dose:  300 mg


Levetiracetam (Keppra)  1,000 mg PO BID [facility]


   Last Admin: 09/07/17 16:33 Dose:  1,000 mg











- Labs


Labs: 


 





 09/08/17 08:01 





 











PT  11.1 Seconds (9.8-13.1)   09/06/17  23:47    


 


INR  1.1  (0.9-1.2)   09/06/17  23:47    


 


APTT  28.4 Seconds (25.6-37.1)   09/06/17  23:47    














- Head Exam


Head Exam: ATRAUMATIC





- Eye Exam


Eye Exam: EOMI





- ENT Exam


ENT Exam: Mucous Membranes Moist





- Respiratory Exam


Respiratory Exam: Clear to Ausculation Bilateral





- Cardiovascular Exam


Cardiovascular Exam: REGULAR RHYTHM





- GI/Abdominal Exam


GI & Abdominal Exam: Soft, Normal Bowel Sounds.  absent: Distended, Tenderness





Assessment and Plan


(1) Bowel obstruction


Assessment & Plan: 


Clinically improving. Abdomen now is soft. Having KUB done now. Likely 

spontaneous resolution of obstruction and probable discharge today unless KUB 

shows otherwise.


Status: Acute

## 2017-09-20 ENCOUNTER — HOSPITAL ENCOUNTER (OUTPATIENT)
Dept: HOSPITAL 14 - H.ER | Age: 57
Setting detail: OBSERVATION
Discharge: HOME | End: 2017-09-20
Attending: EMERGENCY MEDICINE | Admitting: EMERGENCY MEDICINE
Payer: COMMERCIAL

## 2017-09-20 VITALS — OXYGEN SATURATION: 98 %

## 2017-09-20 VITALS — HEART RATE: 51 BPM | DIASTOLIC BLOOD PRESSURE: 83 MMHG | SYSTOLIC BLOOD PRESSURE: 150 MMHG

## 2017-09-20 VITALS — RESPIRATION RATE: 18 BRPM | TEMPERATURE: 97.8 F

## 2017-09-20 DIAGNOSIS — R56.9: ICD-10-CM

## 2017-09-20 DIAGNOSIS — T78.3XXA: Primary | ICD-10-CM

## 2017-09-20 DIAGNOSIS — I10: ICD-10-CM

## 2017-09-20 DIAGNOSIS — E78.5: ICD-10-CM

## 2017-09-20 DIAGNOSIS — Z79.899: ICD-10-CM

## 2017-09-20 LAB
BASOPHILS # BLD AUTO: 0.1 K/UL (ref 0–0.2)
BASOPHILS NFR BLD: 0.8 % (ref 0–2)
BUN SERPL-MCNC: 10 MG/DL (ref 9–20)
CALCIUM SERPL-MCNC: 9.7 MG/DL (ref 8.4–10.2)
CHLORIDE SERPL-SCNC: 105 MMOL/L (ref 98–107)
CO2 SERPL-SCNC: 25 MMOL/L (ref 22–30)
EOSINOPHIL # BLD AUTO: 0.2 K/UL (ref 0–0.7)
EOSINOPHIL NFR BLD: 2.3 % (ref 0–4)
ERYTHROCYTE [DISTWIDTH] IN BLOOD BY AUTOMATED COUNT: 17.1 % (ref 11.5–14.5)
GLUCOSE SERPL-MCNC: 118 MG/DL (ref 75–110)
HCT VFR BLD CALC: 44.3 % (ref 35–51)
LYMPHOCYTES # BLD AUTO: 1.4 K/UL (ref 1–4.3)
LYMPHOCYTES NFR BLD AUTO: 18.8 % (ref 20–40)
MCH RBC QN AUTO: 28.6 PG (ref 27–31)
MCHC RBC AUTO-ENTMCNC: 33.2 G/DL (ref 33–37)
MCV RBC AUTO: 86.1 FL (ref 80–94)
MONOCYTES # BLD: 0.8 K/UL (ref 0–0.8)
MONOCYTES NFR BLD: 10.5 % (ref 0–10)
NEUTROPHILS # BLD: 5.2 K/UL (ref 1.8–7)
NEUTROPHILS NFR BLD AUTO: 67.6 % (ref 50–75)
NRBC BLD AUTO-RTO: 0.1 % (ref 0–0)
PLATELET # BLD: 150 K/UL (ref 130–400)
PMV BLD AUTO: 8.7 FL (ref 7.2–11.7)
POTASSIUM SERPL-SCNC: 4.3 MMOL/L (ref 3.6–5)
SODIUM SERPL-SCNC: 140 MMOL/L (ref 132–148)
WBC # BLD AUTO: 7.7 K/UL (ref 4.8–10.8)

## 2017-09-20 PROCEDURE — 96375 TX/PRO/DX INJ NEW DRUG ADDON: CPT

## 2017-09-20 PROCEDURE — 80048 BASIC METABOLIC PNL TOTAL CA: CPT

## 2017-09-20 PROCEDURE — 99283 EMERGENCY DEPT VISIT LOW MDM: CPT

## 2017-09-20 PROCEDURE — 85025 COMPLETE CBC W/AUTO DIFF WBC: CPT

## 2017-09-20 PROCEDURE — 96361 HYDRATE IV INFUSION ADD-ON: CPT

## 2017-09-20 PROCEDURE — 96374 THER/PROPH/DIAG INJ IV PUSH: CPT

## 2017-09-20 NOTE — ED PDOC
HPI: Allergic Reaction


Time Seen by Provider: 09/20/17 07:31


Chief Complaint (Nursing): Allergic Reaction


History Per: Patient


History/Exam Limitations: no limitations


Onset/Duration Of Symptoms: Other (awaoke with sx)


Current Symptoms Are (Timing): Better


Possible Cause: Unknown


Associated Symptoms: Skin Rash, Swelling, Trouble Swallowing.  denies: Dyspnea, 

Dizziness, Itching, Redness, Chest Pain


Home/EMS Treatment: None


Severity: Moderate


Additional History Per: Patient


Additional Complaint(s): 





pt awoke with tongue swelling and dyspahgia, no prev sx no rash or cp or 

wheezing no prev sx nothing makes it better or worse no treatment at home





Past Medical History


Reviewed: Historical Data, Nursing Documentation, Vital Signs


Vital Signs: 


 Last Vital Signs











Temp  97.8 F   09/20/17 07:19


 


Pulse  73   09/20/17 07:19


 


Resp  18   09/20/17 07:19


 


BP  175/91 H  09/20/17 07:19


 


Pulse Ox  98   09/20/17 07:19














- Medical History


PMH: HTN, Hyperlipidemia, Seizures


   Denies: Chronic Kidney Disease





- Family History


Family History: States: Unknown Family Hx





- Living Arrangements


Living Arrangements: With Family





- Social History


Current smoker - smoking cessation education provided: No





- Immunization History


Hx Tetanus Toxoid Vaccination: No


Hx Influenza Vaccination: No


Hx Pneumococcal Vaccination: No





- Home Medications


Home Medications: 


 Ambulatory Orders











 Medication  Instructions  Recorded


 


Isosorbide 30 mg PO DAILY 03/24/15


 


Keppra 1,000 mg PO BID 03/24/15


 


Lamictal Xl 300 mg PO DAILY 03/24/15


 


Simvastatin 20 mg PO HS 03/24/15


 


Sennosides/Docusate Sodium [Senna 1 each PO BID #60 tablet 09/08/17





Laxative Tablet]  


 


Loratadine [Claritin] 10 mg PO DAILY #30 tab 09/20/17


 


Methylprednisolone [Medrol Dose 4 mg PO DAILY #21 mg 09/20/17





Pack (21 tabs)]  














- Allergies


Allergies/Adverse Reactions: 


 Allergies











Allergy/AdvReac Type Severity Reaction Status Date / Time


 


No Known Allergies Allergy   Verified 09/06/17 23:18














Review of Systems


ROS Statement: Except As Marked, All Systems Reviewed And Found Negative


Constitutional: Negative for: Fever, Chills


ENT: Positive for: Mouth Swelling, Throat Swelling, Other (tongue swelling).  

Negative for: Nose Pain, Nose Discharge, Mouth Pain, Throat Pain


Cardiovascular: Negative for: Chest Pain, Palpitations


Respiratory: Negative for: Cough, Shortness of Breath


Gastrointestinal: Negative for: Nausea, Vomiting, Abdominal Pain


Genitourinary Male: Negative for: Dysuria


Skin: Negative for: Rash


Neurological: Negative for: Weakness, Numbness, Headache





Physical Exam





- Reviewed


Nursing Documentation Reviewed: Yes


Vital Signs Reviewed: Yes





- Physical Exam


Appears: Positive for: Uncomfortable


Head Exam: Positive for: ATRAUMATIC, NORMAL INSPECTION, NORMOCEPHALIC


Eye Exam: Positive for: Normal appearance, EOMI, PERRL.  Negative for: Nystagmus

, Periorbital swelling, Periorbital tenderness, Conjunctival injection, Scleral 

icterus


ENT: Positive for: Other (mild tounge swelling).  Negative for: Nasal Congestion

, Pharyngeal Erythema


Neck: Positive for: Normal, Painless ROM, Supple.  Negative for: Decreased ROM, 

Limited ROM, Trachea Midline, Pain On Movement Of Neck


Cardiovascular/Chest: Positive for: Regular Rate, Rhythm, Chest Non Tender.  

Negative for: Edema, Gallop, Murmur, Bradycardia, Tachycardia, Ectopy, Friction 

Rub, Irregularly Irregular


Respiratory: Positive for: Normal Breath Sounds.  Negative for: Decreased 

Breath Sounds, Accessory Muscle Use, Crackles, Rales, Rhonchi, Stridor, Wheezing

, Respiratory Distress


Gastrointestinal/Abdominal: Positive for: Normal Exam, Bowel Sounds, Soft.  

Negative for: Tenderness


Back: Positive for: Normal Inspection.  Negative for: L CVA Tenderness, R CVA 

Tenderness


Extremity: Positive for: Normal ROM.  Negative for: Tenderness, Pedal Edema, 

Calf Tenderness, Deformity, Swelling


Neurologic/Psych: Positive for: Alert, CNs II-XII, Oriented, Mood/Affect (calm)

, Gait (steady).  Negative for: Motor/Sensory Deficits, Facial Droop





- Laboratory Results


Result Diagrams: 


 09/20/17 08:30





 09/20/17 08:30





- ECG


O2 Sat by Pulse Oximetry: 98


Pulse Ox Interpretation: Normal





- Progress


ED Course And Treament: 





830 am pt placed in obs.





1030 am pt resting comfotably sx moderately improved, tongue swelling reduced.





pt sx resolved after treatment. no dysphagua at a ful meal here.  advise close f

/u with pmd, d/c on antihistamines and steroids.


Re-evaluation Time: 12:00


Condition: Improved





Disposition





- Clinical Impression


Clinical Impression: 


 Angioedema








- Patient ED Disposition


Is Patient to be Admitted: No


Counseled Patient/Family Regarding: Studies Performed, Diagnosis, Need For 

Followup, Rx Given





- Disposition


Disposition: Routine/Home


Disposition Time: 12:00


Condition: GOOD

## 2018-06-30 ENCOUNTER — HOSPITAL ENCOUNTER (INPATIENT)
Dept: HOSPITAL 31 - C.ER | Age: 58
LOS: 2 days | Discharge: HOME | DRG: 101 | End: 2018-07-02
Attending: INTERNAL MEDICINE | Admitting: INTERNAL MEDICINE
Payer: COMMERCIAL

## 2018-06-30 VITALS — BODY MASS INDEX: 31.5 KG/M2

## 2018-06-30 DIAGNOSIS — F10.10: ICD-10-CM
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
## 2018-06-30 DIAGNOSIS — Z87.891: ICD-10-CM

## 2018-06-30 DIAGNOSIS — I10: ICD-10-CM

## 2018-06-30 DIAGNOSIS — G40.909: Primary | ICD-10-CM

## 2018-06-30 LAB
ALBUMIN SERPL-MCNC: 5 [, G/DL] (ref 3.5–5)
ALBUMIN/GLOB SERPL: 1.2 [,] (ref 1–2.1)
ALT SERPL-CCNC: 85 [, U/L] (ref 21–72)
AST SERPL-CCNC: 103 [, U/L] (ref 17–59)
BACTERIA #/AREA URNS HPF: (no result) [,]
BASOPHILS # BLD AUTO: 0.1 [, K/UL] (ref 0–0.2)
BASOPHILS NFR BLD: 0.6 [, %] (ref 0–2)
BILIRUB UR-MCNC: NEGATIVE [,]
BUN SERPL-MCNC: 13 [, MG/DL] (ref 9–20)
CALCIUM SERPL-MCNC: 9.9 [, MG/DL] (ref 8.6–10.4)
EOSINOPHIL # BLD AUTO: 0 [, K/UL] (ref 0–0.7)
EOSINOPHIL NFR BLD: 0.2 [, %] (ref 0–4)
ERYTHROCYTE [DISTWIDTH] IN BLOOD BY AUTOMATED COUNT: 14.4 [, %] (ref 11.5–14.5)
GFR NON-AFRICAN AMERICAN: > 60 [,]
GLUCOSE UR STRIP-MCNC: (no result) [, MG/DL]
HGB BLD-MCNC: 16.1 [, G/DL] (ref 12–18)
LEUKOCYTE ESTERASE UR-ACNC: (no result) [, LEU/UL]
LYMPHOCYTE: 11 [, %] (ref 20–40)
LYMPHOCYTES # BLD AUTO: 0.7 [, K/UL] (ref 1–4.3)
LYMPHOCYTES NFR BLD AUTO: 5.7 [, %] (ref 20–40)
MCH RBC QN AUTO: 30.3 [, PG] (ref 27–31)
MCHC RBC AUTO-ENTMCNC: 33.1 [, G/DL] (ref 33–37)
MCV RBC AUTO: 91.7 [, FL] (ref 80–94)
MONOCYTE: 4 [, %] (ref 0–10)
MONOCYTES # BLD: 1 [, K/UL] (ref 0–0.8)
MONOCYTES NFR BLD: 7.4 [, %] (ref 0–10)
NEUTROPHILS # BLD: 11.3 [, K/UL] (ref 1.8–7)
NEUTROPHILS NFR BLD AUTO: 85 [, %] (ref 50–75)
NEUTROPHILS NFR BLD AUTO: 86.1 [, %] (ref 50–75)
NRBC BLD AUTO-RTO: 0 [, %] (ref 0–2)
PH UR STRIP: 5 [,] (ref 5–8)
PLATELET # BLD EST: NORMAL [,]
PLATELET # BLD: 195 [, K/UL] (ref 130–400)
PMV BLD AUTO: 9.1 [, FL] (ref 7.2–11.7)
PROT UR STRIP-MCNC: (no result) [, MG/DL]
RBC # BLD AUTO: 5.32 [, MIL/UL] (ref 4.4–5.9)
RBC # UR STRIP: (no result) [,]
SP GR UR STRIP: 1.02 [,] (ref 1–1.03)
SQUAMOUS EPITHIAL: < 1 [, /HPF] (ref 0–5)
TOTAL CELLS COUNTED BLD: 100 [,]
UROBILINOGEN UR-MCNC: NORMAL [, MG/DL] (ref 0.2–1)
WBC # BLD AUTO: 13.1 [, K/UL] (ref 4.8–10.8)

## 2018-06-30 RX ADMIN — POTASSIUM CHLORIDE, DEXTROSE MONOHYDRATE AND SODIUM CHLORIDE SCH MLS/HR: 150; 5; 450 INJECTION, SOLUTION INTRAVENOUS at 23:50

## 2018-06-30 NOTE — CT
EXAM:

  CT Head Without Intravenous Contrast



EXAM DATE/TIME:

  6/30/2018 9:08 PM



CLINICAL HISTORY:

  58 years old, male; Signs and symptoms; Other: Seizure



TECHNIQUE:

  Axial computed tomography images of the head/brain without intravenous 

contrast.  All CT scans at this facility use at least one of these dose 

optimization techniques: automated exposure control; mA and/or kV adjustment 

per patient size (includes targeted exams where dose is matched to clinical 

indication); or iterative reconstruction.

  Coronal and sagittal reformatted images were created and reviewed.



COMPARISON:

  There are no prior studies for comparison.



FINDINGS:

  Artifacts:  Motion artifact degrades image quality.

  Brain:  Ventricles are normal in size and configuration. There is no midline 

shift. There are no intra-axial or extra-axial mass lesions or areas of 

hemorrhage. There is right posterior parietal encephalomalacia deep to the 

craniotomy. There are no abnormal fluid collections. Gray-white differentiation 

is maintained.

  Ventricles:  See above

  Bones: There is an old right craniotomy.  

  Soft tissues:  unremarkable

  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 There is mucoperiosteal 

thickening and an air-fluid level in the left maxillary sinus. There is 

mucoperiosteal thickening in the right maxillary sinus. Postsurgical changes in 

the posterolateral wall of the right maxillary sinus. There is right frontal 

sinusitis.

  Mastoid air cells:  Ears and mastoids: Middle ears and mastoids are 

unremarkable. There is streak artifact from an earring

  Orbits:  Orbital contents are unremarkable.



IMPRESSION:  Remote right posterior parietal craniotomy, encephalomalacia in 

the underlying posterior right parietal lobe, no acute intracranial 

abnormality; sinus disease

## 2018-06-30 NOTE — C.PDOC
History Of Present Illness


58 year old male with PMHx of seizures presents to the ED for evaluation of 

witnessed at home. While en route to the ED in the ambulance patient had 3 more 

witnessed by EMS seizures of 45 seconds each. Patient was given 2 mg of ativan 

IV. Patient admits to drinking the equivalent of 10-15 alcoholic beverages/day (

6 x 24 ounce beers, and "a few shots")





pt's sister presents later to support the HPI. Pt heavy drinker of alcohol, 

unclear medication compliance.


pt's sister insists pt is admitted overnight as pts mother is elderly and 

cannot care for him alone.


pt's sister insists on head CT, though no clinical indication and unnecessary 

radiation to brain is explained, "because his wife wants it."








Time Seen by Provider: 18 18:39


Chief Complaint (Nursing): Seizure


History Per: Patient, EMS


History/Exam Limitations: no limitations


Recent Seizure Activity Began: Just Before Arrival


Number Of Seizures: Multiple


Length Of Seizures (Duration): Seconds (45)


Quality Of Seizure: Generalized


Post-ictal Period: Yes


Severity: Mild


Recent travel outside of the [place]: No


Additional History Per: Patient, EMS





Past Medical History


Reviewed: Historical Data, Nursing Documentation, Vital Signs


Vital Signs: 


 Last Vital Signs











Temp  98.5 F   18 22:35


 


Pulse  71   18 22:35


 


Resp  18   18 22:35


 


BP  180/89 H  18 22:35


 


Pulse Ox  97   18 22:35














- Medical History


PMH: Seizures


Surgical History: No Surg Hx


Family History: States: Unknown Family Hx





- Social History


Hx Tobacco Use: No


Hx Alcohol Use: No


Hx Substance Use: No





- Immunization History


Hx Tetanus Toxoid Vaccination: No


Hx Influenza Vaccination: No


Hx Pneumococcal Vaccination: No





Review Of Systems


Constitutional: Negative for: Fever, Chills


Cardiovascular: Negative for: Chest Pain


Respiratory: Negative for: Shortness of Breath


Gastrointestinal: Negative for: Nausea, Vomiting


Genitourinary: Negative for: Dysuria, Frequency, Incontinence


Neurological: Positive for: Confusion.  Negative for: Weakness, Numbness, 

Headache, Dizziness





Physical Exam





- Physical Exam


Appears: Non-toxic, Confused


Skin: Normal Color, Warm, Dry


Head: Atraumatic, Normacephalic


Eye(s): bilateral: Normal Inspection


Oral Mucosa: Moist


Neck: Normal ROM, No Midline Cervical Tenderness, Supple


Chest: Symmetrical


Cardiovascular: Rhythm Regular


Respiratory: Normal Breath Sounds, No Rales, No Rhonchi, No Wheezing


Gastrointestinal/Abdominal: Soft, No Tenderness, No Guarding, No Rebound


Extremity: Normal ROM, No Tenderness, No Swelling


Extremity: Bilateral: Atraumatic


Neurological/Psych: Oriented x3


Gait: Unable To Assess





ED Course And Treatment





- Laboratory Results


Result Diagrams: 


 18 19:10





 18 19:09


Lab Interpretation: Normal (bnp/trop neg,  mild elev)


ECG: Interpreted By Me


ECG Rhythm: Sinus Rhythm, Nonspecific Changes (+ t^ inversions III, AVF, v5, v6 

no reciprocal changes.)


ECG Interpretation: Normal


Rate From EC


O2 Sat by Pulse Oximetry: 98 (ON RA)


Pulse Ox Interpretation: Normal





- Radiology


CXR: Interpreted by Me


CXR Interpretation: Yes: No Acute Disease





- CT Scan/US


  ** CT HEAD


Other Rad Studies (CT/US): Read By Radiologist, Radiology Report Reviewed


CT/US Interpretation: IMPRESSION: Remote right posterior parietal craniotomy, 

encephalomalacia in the underlying.  posterior right parietal lobe, no acute 

intracranial abnormality; sinus disease.  Thank you for allowing us to 

participate in the care of your patient.  Dictated and Authenticated by: Stacy Livingston MD.  2018 10:37 PM Eastern Time ([place] & [place])


Progress Note: 2100: pt irritable, argumentative.  pt allows pt's family to 

decide if he will be admitted for overnight Obs


Reevaluation Time: 21:09


Reassessment Condition: Improved (more awake and aware)





- Physician Consult Information


Outcome Of Conversation: 2100: d/w Dr. Nunez, Medicine On Call, ok to admit.





Medical Decision Making


Medical Decision Making: 


Impression: seizures


Plan:


* EKG


* CXR


* Labs


* IV fluids


* Tylenol 975 mg ID





typical seizure


unclear medication compliance with Keppra and Lamictal


meds restarted





Consider alcohol w/d seizures 


low level alcohol 12 may be causative


Ativan given by EMS 


Librium for CIWA started in ED





EKG abn


T^ inversions III, AVG, v5, v6 of unclear significance


normal BNP/Trop


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

, among other pathologies.











Disposition


Doctor Will See Patient In The: Hospital


Counseled Patient/Family Regarding: Studies Performed, Diagnosis





- Disposition


Disposition: HOSPITALIZED


Disposition Time: 21:08


Condition: GOOD





- Clinical Impression


Clinical Impression: 


 Seizure








- Scribe Statement


The provider has reviewed the documentation as recorded by the Scribe


Aki Regan





All medical record entries made by the Conchisibe were at my direction and 

personally dictated by me. I have reviewed the chart and agree that the record 

accurately reflects my personal performance of the history, physical exam, 

medical decision making, and the department course for this patient. I have 

also personally directed, reviewed, and agree with the discharge instructions 

and disposition.

## 2018-07-01 LAB
ALBUMIN SERPL-MCNC: 4 [, G/DL] (ref 3.5–5)
ALBUMIN/GLOB SERPL: 1.2 [,] (ref 1–2.1)
ALT SERPL-CCNC: 65 [, U/L] (ref 21–72)
AST SERPL-CCNC: 58 [, U/L] (ref 17–59)
BUN SERPL-MCNC: 12 [, MG/DL] (ref 9–20)
CALCIUM SERPL-MCNC: 9.1 [, MG/DL] (ref 8.6–10.4)
ERYTHROCYTE [DISTWIDTH] IN BLOOD BY AUTOMATED COUNT: 14 [, %] (ref 11.5–14.5)
GFR NON-AFRICAN AMERICAN: > 60 [,]
HGB BLD-MCNC: 14.4 [, G/DL] (ref 12–18)
MCH RBC QN AUTO: 31.5 [, PG] (ref 27–31)
MCHC RBC AUTO-ENTMCNC: 34.8 [, G/DL] (ref 33–37)
MCV RBC AUTO: 90.6 [, FL] (ref 80–94)
PLATELET # BLD: 153 [, K/UL] (ref 130–400)
PMV BLD AUTO: 8.9 [, FL] (ref 7.2–11.7)
RBC # BLD AUTO: 4.57 [, MIL/UL] (ref 4.4–5.9)
WBC # BLD AUTO: 10.8 [, K/UL] (ref 4.8–10.8)

## 2018-07-01 RX ADMIN — THIAMINE HYDROCHLORIDE SCH MG: 100 INJECTION, SOLUTION INTRAMUSCULAR; INTRAVENOUS at 09:12

## 2018-07-01 RX ADMIN — POTASSIUM CHLORIDE, DEXTROSE MONOHYDRATE AND SODIUM CHLORIDE SCH MLS/HR: 150; 5; 450 INJECTION, SOLUTION INTRAVENOUS at 19:58

## 2018-07-01 RX ADMIN — POTASSIUM CHLORIDE, DEXTROSE MONOHYDRATE AND SODIUM CHLORIDE SCH MLS/HR: 150; 5; 450 INJECTION, SOLUTION INTRAVENOUS at 09:10

## 2018-07-01 RX ADMIN — ENOXAPARIN SODIUM SCH MG: 40 INJECTION SUBCUTANEOUS at 09:12

## 2018-07-01 NOTE — RAD
PROCEDURE:  CHEST RADIOGRAPH, 1 VIEW



HISTORY:

Seizure



COMPARISON:

None available.



FINDINGS:



LUNGS:

Clear.



PLEURA:

No pneumothorax or pleural fluid seen.



CARDIOVASCULAR:

Cardiomegaly.



OSSEOUS STRUCTURES:

No significant abnormalities.



VISUALIZED UPPER ABDOMEN:

Multiple metallic clips seen overlying the right lung base and upper 

quadrant of the abdomen.  Clinical correlation surgical history. 



OTHER FINDINGS:

None. 



IMPRESSION:

No active disease.

## 2018-07-01 NOTE — CP.PCM.CON
History of Present Illness





- History of Present Illness


History of Present Illness: 


 Neurology Consultation Note:





Mr. Horner is a 58-year-old man with a past medical history of previously 

resected right parietal lobe mass (benign), seizure disorder (on Keppra and 

Lamictal), who sees Dr. Ley for neurology, and states that he has been 

drinking heavily and has been under stress taking care of his mother.  He had a 

seizure yesterday and EMS was called.  He had 3 subsequent seizures in the 

vehicle, was given Ativan and has been stable since.  He is not back to baseline

, pleasant, conversant and has no complaints.  He would like to go home.  CT 

scan of the head did not show any acute findings, but did demonstrate the 

previous craniotomy and right parietal lobe encephalomalacia where the tumor 

was.  





Review of Systems





- Review of Systems


All systems: reviewed and no additional remarkable complaints except





Past Patient History





- Past Medical History & Family History


Past Medical History?: Yes





- Past Social History


Smoking Status: Former Smoker





- NEUROLOGICAL


Hx Seizures: Yes





- MUSCULOSKELETAL/RHEUMATOLOGICAL


Hx Falls: Yes (2 weeks ago)





- PSYCHIATRIC


Hx Substance Use: No





- SURGICAL HISTORY


Hx Surgeries: No





- ANESTHESIA


Hx Anesthesia: No





Meds


Allergies/Adverse Reactions: 


 Allergies











Allergy/AdvReac Type Severity Reaction Status Date / Time


 


seafood Allergy  SHORTNESS Uncoded 06/30/18 18:06





   OF BREATH  














- Medications


Medications: 


 Current Medications





Amlodipine Besylate (Norvasc)  5 mg PO DAILY [facility]


Enoxaparin Sodium (Lovenox)  40 mg SC DAILY [facility]


   Last Admin: 07/01/18 09:12 Dose:  40 mg


Potassium Chloride/Dextrose/Sod Cl (Potassium Chl 20 Meq In D5-1/2ns)  1,000 

mls @ 100 mls/hr IV .Q10H [facility]


   Last Admin: 07/01/18 09:10 Dose:  100 mls/hr


Levetiracetam (Keppra)  500 mg PO BID [facility]


   Last Admin: 07/01/18 09:12 Dose:  500 mg


Lorazepam (Ativan)  2 mg IVP Q4H PRN


   PRN Reason: Anxiety


   Last Admin: 07/01/18 00:19 Dose:  2 mg


Pneumococcal Polyvalent Vaccine (Pneumovax 23 Vaccine)  0.5 ml IM .ONCE ONE


   Stop: 07/03/18 14:01


Thiamine HCl (Vitamin B1 Inj)  100 mg IM DAILY [facility]


   Last Admin: 07/01/18 09:12 Dose:  100 mg











Physical Exam





- Neurological Exam


Neurological exam: Alert, CN II-XII Intact, Normal Gait, Oriented x3, Reflexes 

Normal





Results





- Vital Signs


Recent Vital Signs: 


 Last Vital Signs











Temp  98.0 F   07/01/18 07:00


 


Pulse  72   07/01/18 07:40


 


Resp  20   07/01/18 07:00


 


BP  135/70   07/01/18 13:30


 


Pulse Ox  98   07/01/18 07:00














- Labs


Result Diagrams: 


 07/01/18 07:50





 07/01/18 07:50


Labs: 


 Laboratory Results - last 24 hr











  06/30/18 06/30/18 06/30/18





  18:39 19:09 19:09


 


WBC   


 


RBC   


 


Hgb   


 


Hct   


 


MCV   


 


MCH   


 


MCHC   


 


RDW   


 


Plt Count   


 


MPV   


 


Neut % (Auto)   


 


Lymph % (Auto)   


 


Mono % (Auto)   


 


Eos % (Auto)   


 


Baso % (Auto)   


 


Neut # (Auto)   


 


Lymph # (Auto)   


 


Mono # (Auto)   


 


Eos # (Auto)   


 


Baso # (Auto)   


 


Neutrophils % (Manual)   


 


Lymphocytes % (Manual)   


 


Monocytes % (Manual)   


 


Platelet Estimate   


 


Sodium   150 H 


 


Potassium   3.9 


 


Chloride   109 H 


 


Carbon Dioxide   17 L 


 


Anion Gap   28 H 


 


BUN   13 


 


Creatinine   0.9 


 


Est GFR ( Amer)   > 60 


 


Est GFR (Non-Af Amer)   > 60 


 


POC Glucose (mg/dL)  130 H  


 


Random Glucose   132 H 


 


Calcium   9.9 


 


Magnesium   


 


Total Bilirubin   0.6 


 


AST   103 H 


 


ALT   85 H 


 


Alkaline Phosphatase   89 


 


Total Creatine Kinase   602 H 


 


Troponin I   < 0.0120 


 


Total Protein   9.2 H 


 


Albumin   5.0 


 


Globulin   4.1 H 


 


Albumin/Globulin Ratio   1.2 


 


Urine Color   


 


Urine Clarity   


 


Urine pH   


 


Ur Specific Gravity   


 


Urine Protein   


 


Urine Glucose (UA)   


 


Urine Ketones   


 


Urine Blood   


 


Urine Nitrate   


 


Urine Bilirubin   


 


Urine Urobilinogen   


 


Ur Leukocyte Esterase   


 


Urine WBC (Auto)   


 


Urine RBC (Auto)   


 


Ur Squamous Epith Cells   


 


Urine Bacteria   


 


Urine Opiates Screen   


 


Urine Methadone Screen   


 


Ur Barbiturates Screen   


 


Valproic Acid    < 10.0 L


 


Ur Phencyclidine Scrn   


 


Ur Amphetamines Screen   


 


U Benzodiazepines Scrn   


 


U Oth Cocaine Metabols   


 


U Cannabinoids Screen   


 


Alcohol, Quantitative   12 H 














  06/30/18 06/30/18 06/30/18





  19:10 19:44 19:44


 


WBC  13.1 H  


 


RBC  5.32  


 


Hgb  16.1  


 


Hct  48.8  


 


MCV  91.7  


 


MCH  30.3  


 


MCHC  33.1  


 


RDW  14.4  


 


Plt Count  195  


 


MPV  9.1  


 


Neut % (Auto)  86.1 H  


 


Lymph % (Auto)  5.7 L  


 


Mono % (Auto)  7.4  


 


Eos % (Auto)  0.2  


 


Baso % (Auto)  0.6  


 


Neut # (Auto)  11.3 H  


 


Lymph # (Auto)  0.7 L  


 


Mono # (Auto)  1.0 H  


 


Eos # (Auto)  0.0  


 


Baso # (Auto)  0.1  


 


Neutrophils % (Manual)  85 H  


 


Lymphocytes % (Manual)  11 L  


 


Monocytes % (Manual)  4  


 


Platelet Estimate  Normal  


 


Sodium   


 


Potassium   


 


Chloride   


 


Carbon Dioxide   


 


Anion Gap   


 


BUN   


 


Creatinine   


 


Est GFR ( Amer)   


 


Est GFR (Non-Af Amer)   


 


POC Glucose (mg/dL)   


 


Random Glucose   


 


Calcium   


 


Magnesium   


 


Total Bilirubin   


 


AST   


 


ALT   


 


Alkaline Phosphatase   


 


Total Creatine Kinase   


 


Troponin I   


 


Total Protein   


 


Albumin   


 


Globulin   


 


Albumin/Globulin Ratio   


 


Urine Color   Yellow 


 


Urine Clarity   Clear 


 


Urine pH   5.0 


 


Ur Specific Gravity   1.023 


 


Urine Protein   3+ H 


 


Urine Glucose (UA)   1+ H 


 


Urine Ketones   Trace 


 


Urine Blood   2+ H 


 


Urine Nitrate   Negative 


 


Urine Bilirubin   Negative 


 


Urine Urobilinogen   Normal 


 


Ur Leukocyte Esterase   Neg 


 


Urine WBC (Auto)   17 H 


 


Urine RBC (Auto)   1 


 


Ur Squamous Epith Cells   < 1 


 


Urine Bacteria   Rare 


 


Urine Opiates Screen    Negative


 


Urine Methadone Screen    Negative


 


Ur Barbiturates Screen    Negative


 


Valproic Acid   


 


Ur Phencyclidine Scrn    Negative


 


Ur Amphetamines Screen    Negative


 


U Benzodiazepines Scrn    Negative


 


U Oth Cocaine Metabols    Negative


 


U Cannabinoids Screen    Negative


 


Alcohol, Quantitative   














  07/01/18 07/01/18





  07:50 07:50


 


WBC  10.8 


 


RBC  4.57 


 


Hgb  14.4 


 


Hct  41.4 


 


MCV  90.6 


 


MCH  31.5 H 


 


MCHC  34.8 


 


RDW  14.0 


 


Plt Count  153 


 


MPV  8.9 


 


Neut % (Auto)  


 


Lymph % (Auto)  


 


Mono % (Auto)  


 


Eos % (Auto)  


 


Baso % (Auto)  


 


Neut # (Auto)  


 


Lymph # (Auto)  


 


Mono # (Auto)  


 


Eos # (Auto)  


 


Baso # (Auto)  


 


Neutrophils % (Manual)  


 


Lymphocytes % (Manual)  


 


Monocytes % (Manual)  


 


Platelet Estimate  


 


Sodium   142


 


Potassium   3.6


 


Chloride   106


 


Carbon Dioxide   27


 


Anion Gap   13


 


BUN   12


 


Creatinine   0.7 L


 


Est GFR ( Amer)   > 60


 


Est GFR (Non-Af Amer)   > 60


 


POC Glucose (mg/dL)  


 


Random Glucose   116 H


 


Calcium   9.1


 


Magnesium   2.3


 


Total Bilirubin   1.4 H


 


AST   58


 


ALT   65


 


Alkaline Phosphatase   64


 


Total Creatine Kinase  


 


Troponin I  


 


Total Protein   7.5


 


Albumin   4.0


 


Globulin   3.5


 


Albumin/Globulin Ratio   1.2


 


Urine Color  


 


Urine Clarity  


 


Urine pH  


 


Ur Specific Gravity  


 


Urine Protein  


 


Urine Glucose (UA)  


 


Urine Ketones  


 


Urine Blood  


 


Urine Nitrate  


 


Urine Bilirubin  


 


Urine Urobilinogen  


 


Ur Leukocyte Esterase  


 


Urine WBC (Auto)  


 


Urine RBC (Auto)  


 


Ur Squamous Epith Cells  


 


Urine Bacteria  


 


Urine Opiates Screen  


 


Urine Methadone Screen  


 


Ur Barbiturates Screen  


 


Valproic Acid  


 


Ur Phencyclidine Scrn  


 


Ur Amphetamines Screen  


 


U Benzodiazepines Scrn  


 


U Oth Cocaine Metabols  


 


U Cannabinoids Screen  


 


Alcohol, Quantitative  














Assessment & Plan


(1) Seizure


Assessment and Plan: 


The patient has good follow-up with Dr. Ley and has refills of his 

medications.  He was instructed not to drink alcohol, maintain good sleep 

hygiene and avoid seizure triggers.  No further recommendations at this time.  

The patient will take the same home doses of Keppra and Lamictal.  He stated 

that he has enough pills at home and has refills.





Thank you for this consultation.  


Status: Acute   Priority: High

## 2018-07-02 VITALS — DIASTOLIC BLOOD PRESSURE: 94 MMHG | SYSTOLIC BLOOD PRESSURE: 171 MMHG | TEMPERATURE: 98 F

## 2018-07-02 VITALS — RESPIRATION RATE: 20 BRPM | OXYGEN SATURATION: 97 %

## 2018-07-02 VITALS — HEART RATE: 45 BPM

## 2018-07-02 LAB
ALBUMIN SERPL-MCNC: 3.9 [, G/DL] (ref 3.5–5)
ALBUMIN/GLOB SERPL: 1.1 [,] (ref 1–2.1)
ALT SERPL-CCNC: 49 [, U/L] (ref 21–72)
AST SERPL-CCNC: 47 [, U/L] (ref 17–59)
BASOPHILS # BLD AUTO: 0 [, K/UL] (ref 0–0.2)
BASOPHILS NFR BLD: 0.6 [, %] (ref 0–2)
BUN SERPL-MCNC: 11 [, MG/DL] (ref 9–20)
CALCIUM SERPL-MCNC: 9.2 [, MG/DL] (ref 8.6–10.4)
EOSINOPHIL # BLD AUTO: 0.1 [, K/UL] (ref 0–0.7)
EOSINOPHIL NFR BLD: 1.7 [, %] (ref 0–4)
ERYTHROCYTE [DISTWIDTH] IN BLOOD BY AUTOMATED COUNT: 14.3 [, %] (ref 11.5–14.5)
GFR NON-AFRICAN AMERICAN: > 60 [,]
HGB BLD-MCNC: 14.7 [, G/DL] (ref 12–18)
LYMPHOCYTES # BLD AUTO: 1.3 [, K/UL] (ref 1–4.3)
LYMPHOCYTES NFR BLD AUTO: 15.7 [, %] (ref 20–40)
MCH RBC QN AUTO: 31.5 [, PG] (ref 27–31)
MCHC RBC AUTO-ENTMCNC: 34.6 [, G/DL] (ref 33–37)
MCV RBC AUTO: 91 [, FL] (ref 80–94)
MONOCYTES # BLD: 0.7 [, K/UL] (ref 0–0.8)
MONOCYTES NFR BLD: 9.2 [, %] (ref 0–10)
NEUTROPHILS # BLD: 5.9 [, K/UL] (ref 1.8–7)
NEUTROPHILS NFR BLD AUTO: 72.8 [, %] (ref 50–75)
NRBC BLD AUTO-RTO: 0 [, %] (ref 0–2)
PLATELET # BLD: 159 [, K/UL] (ref 130–400)
PMV BLD AUTO: 8.9 [, FL] (ref 7.2–11.7)
RBC # BLD AUTO: 4.68 [, MIL/UL] (ref 4.4–5.9)
WBC # BLD AUTO: 8.1 [, K/UL] (ref 4.8–10.8)

## 2018-07-02 RX ADMIN — ENOXAPARIN SODIUM SCH MG: 40 INJECTION SUBCUTANEOUS at 10:00

## 2018-07-02 RX ADMIN — THIAMINE HYDROCHLORIDE SCH MG: 100 INJECTION, SOLUTION INTRAMUSCULAR; INTRAVENOUS at 10:00

## 2018-07-02 RX ADMIN — POTASSIUM CHLORIDE, DEXTROSE MONOHYDRATE AND SODIUM CHLORIDE SCH MLS/HR: 150; 5; 450 INJECTION, SOLUTION INTRAVENOUS at 06:11

## 2018-07-02 NOTE — CP.PCM.PN
Subjective





- Date & Time of Evaluation


Date of Evaluation: 07/02/18


Time of Evaluation: 13:00





- Subjective


Subjective: 





Internal Medicine Progress Note - Dr Oliva Service





Patient seen and examined at bedside. Per nursing no acute events overnight. 

Patient is doing well, offers no complaints at this time. No seizures. Denies 

headaches, dizziness, cp, palpitations, sob, abdominal pain, urinary symptoms. 





Objective





- Vital Signs/Intake and Output


Vital Signs (last 24 hours): 


 











Temp Pulse Resp BP Pulse Ox


 


 98.0 F   64   20   171/94 H  97 


 


 07/02/18 07:00  07/02/18 07:00  07/02/18 07:00  07/02/18 07:00  07/02/18 07:00








Intake and Output: 


 











 07/02/18 07/02/18





 06:59 18:59


 


Intake Total 820 


 


Output Total 200 


 


Balance 620 














- Medications


Medications: 


 Current Medications





Amlodipine Besylate (Norvasc)  5 mg PO DAILY [facility]


   Last Admin: 07/02/18 08:37 Dose:  5 mg


Enoxaparin Sodium (Lovenox)  40 mg SC DAILY [facility]


   Last Admin: 07/02/18 10:00 Dose:  40 mg


Potassium Chloride/Dextrose/Sod Cl (Potassium Chl 20 Meq In D5-1/2ns)  1,000 

mls @ 100 mls/hr IV .Q10H [facility]


   Last Admin: 07/02/18 06:11 Dose:  100 mls/hr


Levetiracetam (Keppra)  500 mg PO BID [facility]


   Last Admin: 07/02/18 10:00 Dose:  500 mg


Lorazepam (Ativan)  2 mg IVP Q4H PRN


   PRN Reason: Anxiety


   Last Admin: 07/01/18 00:19 Dose:  2 mg


Pneumococcal Polyvalent Vaccine (Pneumovax 23 Vaccine)  0.5 ml IM .ONCE ONE


   Stop: 07/03/18 14:01


Thiamine HCl (Vitamin B1 Inj)  100 mg IM DAILY [facility]


   Last Admin: 07/02/18 10:00 Dose:  100 mg











- Labs


Labs: 


 





 07/02/18 10:26 





 07/02/18 10:26 











- Constitutional


Appears: Well, No Acute Distress





- Head Exam


Head Exam: ATRAUMATIC, NORMAL INSPECTION, NORMOCEPHALIC





- Eye Exam


Eye Exam: EOMI, Normal appearance


Pupil Exam: NORMAL ACCOMODATION





- ENT Exam


ENT Exam: Mucous Membranes Moist





- Neck Exam


Neck Exam: Full ROM





- Respiratory Exam


Respiratory Exam: Clear to Ausculation Bilateral, NORMAL BREATHING PATTERN.  

absent: Rales, Rhonchi, Wheezes





- Cardiovascular Exam


Cardiovascular Exam: REGULAR RHYTHM, +S1, +S2





- GI/Abdominal Exam


GI & Abdominal Exam: Soft.  absent: Guarding, Rigid, Tenderness





- Extremities Exam


Extremities Exam: Normal Inspection





- Neurological Exam


Neurological Exam: Alert, Awake, Oriented x3





- Psychiatric Exam


Psychiatric exam: Normal Affect, Normal Mood





- Skin


Skin Exam: Normal Color, Warm





Assessment and Plan





- Assessment and Plan (Free Text)


Assessment: 





A/P: Patient is a 58 year old male with past medical history of right parietal 

lobe resection, seizure disorder, alcohol abuse, noncompliance? presented to 

the ED for witness seizure activity at home by family. Per ED document, patient 

also had 3 more witnessed seizures by EMS that were 45 seconds each. While en 

route he was given Ativan IV. 





Seizure Disorder


-Stable, afebrile


-CT head : no acute intracranial pathology 


-No reported seizures overnight


-Continue Keppra 500mg PO BID, Lamictal 150mg PO daily


-Neurology on consult, help appreciated


-Seizure precautions


-Aspiration precautions





Alcohol Abuse


-Alcohol level 12 on admission


-Per ED note, patient admits to drinking 10-15 alcoholic beverages/day


-Patient advised must stop drinking as this is detrimental to his health


-Patient verbalizes understanding


-Recommend AA meetings outpatient





Hypertension


-IV fluids discontinued 


-Amlodipine 10mg PO daily


-Monitor BPs 





DISPO: Patient is cleared for discharge home. Patient to follow up with his 

Neurologist and PMD within 1 week. Patient states that he needs refills. He was 

given prescriptions for Keppra, Norvasc, and Lamictal. Plan discussed with Dr Oliva.





Elysia Nava DO PGY-2

## 2018-07-02 NOTE — CARD
--------------- APPROVED REPORT --------------





EKG Measurement

Heart [id number]

WV [id number]

[id number]

[id number]

[id number]



<Conclusion>

Normal sinus rhythm with sinus arrhythmia

Moderate voltage criteria for LVH, may be normal variant

ST & T wave abnormality, consider inferior ischemia

Abnormal ECG

## 2018-08-20 ENCOUNTER — HOSPITAL ENCOUNTER (OUTPATIENT)
Dept: HOSPITAL 31 - C.ER | Age: 58
Setting detail: OBSERVATION
LOS: 2 days | Discharge: HOME | End: 2018-08-22
Attending: INTERNAL MEDICINE | Admitting: INTERNAL MEDICINE
Payer: COMMERCIAL

## 2018-08-20 VITALS — BODY MASS INDEX: 31.5 KG/M2

## 2018-08-20 DIAGNOSIS — I10: ICD-10-CM

## 2018-08-20 DIAGNOSIS — E78.5: ICD-10-CM

## 2018-08-20 DIAGNOSIS — Z85.038: ICD-10-CM

## 2018-08-20 DIAGNOSIS — G40.909: ICD-10-CM

## 2018-08-20 DIAGNOSIS — C78.7: ICD-10-CM

## 2018-08-20 DIAGNOSIS — R07.89: Primary | ICD-10-CM

## 2018-08-20 LAB
ALBUMIN SERPL-MCNC: 4.4 G/DL (ref 3.5–5)
ALBUMIN/GLOB SERPL: 1.2 {RATIO} (ref 1–2.1)
ALT SERPL-CCNC: 91 U/L (ref 21–72)
APTT BLD: 30 SECONDS (ref 21–34)
AST SERPL-CCNC: 100 U/L (ref 17–59)
BACTERIA #/AREA URNS HPF: (no result) /[HPF]
BASOPHILS # BLD AUTO: 0.1 K/UL (ref 0–0.2)
BASOPHILS NFR BLD: 0.7 % (ref 0–2)
BILIRUB UR-MCNC: NEGATIVE MG/DL
BNP SERPL-MCNC: 96.4 PG/ML (ref 0–900)
BUN SERPL-MCNC: 14 MG/DL (ref 9–20)
CALCIUM SERPL-MCNC: 9.5 MG/DL (ref 8.6–10.4)
CK MB SERPL-MCNC: 2.8 NG/ML (ref 0–3.38)
EOSINOPHIL # BLD AUTO: 0.1 K/UL (ref 0–0.7)
EOSINOPHIL NFR BLD: 0.8 % (ref 0–4)
ERYTHROCYTE [DISTWIDTH] IN BLOOD BY AUTOMATED COUNT: 13.4 % (ref 11.5–14.5)
GFR NON-AFRICAN AMERICAN: > 60
GLUCOSE UR STRIP-MCNC: NORMAL MG/DL
HDLC SERPL-MCNC: 55 MG/DL (ref 30–70)
HGB BLD-MCNC: 15.5 G/DL (ref 12–18)
HYALINE CASTS #/AREA URNS LPF: (no result) /LPF (ref 0–2)
INR PPP: 1.1
LDLC SERPL-MCNC: 91 MG/DL (ref 0–129)
LEUKOCYTE ESTERASE UR-ACNC: (no result) LEU/UL
LYMPHOCYTES # BLD AUTO: 1.1 K/UL (ref 1–4.3)
LYMPHOCYTES NFR BLD AUTO: 15 % (ref 20–40)
MCH RBC QN AUTO: 31.8 PG (ref 27–31)
MCHC RBC AUTO-ENTMCNC: 34.8 G/DL (ref 33–37)
MCV RBC AUTO: 91.2 FL (ref 80–94)
MONOCYTES # BLD: 0.7 K/UL (ref 0–0.8)
MONOCYTES NFR BLD: 9.7 % (ref 0–10)
NEUTROPHILS # BLD: 5.6 K/UL (ref 1.8–7)
NEUTROPHILS NFR BLD AUTO: 73.8 % (ref 50–75)
NRBC BLD AUTO-RTO: 0 % (ref 0–2)
PH UR STRIP: 5 [PH] (ref 5–8)
PLATELET # BLD: 190 K/UL (ref 130–400)
PMV BLD AUTO: 8.8 FL (ref 7.2–11.7)
PROT UR STRIP-MCNC: (no result) MG/DL
PROTHROMBIN TIME: 12 SECONDS (ref 9.7–12.2)
RBC # BLD AUTO: 4.86 MIL/UL (ref 4.4–5.9)
RBC # UR STRIP: (no result) /UL
SP GR UR STRIP: 1.03 (ref 1–1.03)
SQUAMOUS EPITHIAL: 1 /HPF (ref 0–5)
UROBILINOGEN UR-MCNC: 2 MG/DL (ref 0.2–1)
WBC # BLD AUTO: 7.6 K/UL (ref 4.8–10.8)

## 2018-08-20 PROCEDURE — 81001 URINALYSIS AUTO W/SCOPE: CPT

## 2018-08-20 PROCEDURE — 71046 X-RAY EXAM CHEST 2 VIEWS: CPT

## 2018-08-20 PROCEDURE — 96372 THER/PROPH/DIAG INJ SC/IM: CPT

## 2018-08-20 PROCEDURE — 85730 THROMBOPLASTIN TIME PARTIAL: CPT

## 2018-08-20 PROCEDURE — 93005 ELECTROCARDIOGRAM TRACING: CPT

## 2018-08-20 PROCEDURE — 80061 LIPID PANEL: CPT

## 2018-08-20 PROCEDURE — 80053 COMPREHEN METABOLIC PANEL: CPT

## 2018-08-20 PROCEDURE — 85610 PROTHROMBIN TIME: CPT

## 2018-08-20 PROCEDURE — 85025 COMPLETE CBC W/AUTO DIFF WBC: CPT

## 2018-08-20 PROCEDURE — 94770: CPT

## 2018-08-20 PROCEDURE — 83735 ASSAY OF MAGNESIUM: CPT

## 2018-08-20 PROCEDURE — 99285 EMERGENCY DEPT VISIT HI MDM: CPT

## 2018-08-20 PROCEDURE — 84484 ASSAY OF TROPONIN QUANT: CPT

## 2018-08-20 PROCEDURE — 84443 ASSAY THYROID STIM HORMONE: CPT

## 2018-08-20 PROCEDURE — 83880 ASSAY OF NATRIURETIC PEPTIDE: CPT

## 2018-08-20 PROCEDURE — 93306 TTE W/DOPPLER COMPLETE: CPT

## 2018-08-20 PROCEDURE — 36415 COLL VENOUS BLD VENIPUNCTURE: CPT

## 2018-08-20 NOTE — C.PDOC
History Of Present Illness





<Stacey Martinez - Last Filed: 18 19:04>





<Lea Driver - Last Filed: 18 20:23>


58 years old male with Hx of seizure disorder and HTN presents to ED for 

complaints of non-radiating mid sternal chest pain associated with mild SOB 

that began around noon today. Patient describes symptoms as "something sitting 

on chest" and worsens with deep aspiration. Patient also reports he did not 

take any medication for his pain. Denies numbness, weakness, palpitation, 

dizziness, or nausea.  (Stacey Martinez)


History Per: Patient


History/Exam Limitations: no limitations


Onset/Duration Of Symptoms: Hrs


Current Symptoms Are (Timing): Still Present


Modifying Factors: None


Exacerbating Factors: Deep Breathing


Alleviating Factors: None


Recent travel outside of the [place]: No





<Stacey Martinez - Last Filed: 18 19:04>





<Lea Driver - Last Filed: 18 20:23>


Time Seen by Provider: 18 17:08


Chief Complaint (Nursing): Chest Pain





Past Medical History


Reviewed: Historical Data, Nursing Documentation, Vital Signs





- Medical History


PMH: HTN, Hyperlipidemia, Seizures


   Denies: Chronic Kidney Disease


Family History: States: Unknown Family Hx





- Social History


Hx Tobacco Use: No


Hx Alcohol Use: Yes


Hx Substance Use: No





- Immunization History


Hx Tetanus Toxoid Vaccination: No


Hx Influenza Vaccination: No


Hx Pneumococcal Vaccination: No





<Stacey Martinez - Last Filed: 18 19:04>


Vital Signs: 





 Last Vital Signs











Temp  98.2 F   18 19:22


 


Pulse  68   18 19:22


 


Resp  20   18 19:22


 


BP  129/85   18 19:22


 


Pulse Ox  96   18 19:22














Review Of Systems


Constitutional: Negative for: Fever, Chills


Cardiovascular: Positive for: Chest Pain (Mid sternal ).  Negative for: 

Palpitations


Respiratory: Positive for: Shortness of Breath (Mild).  Negative for: Cough


Gastrointestinal: Negative for: Nausea, Vomiting, Diarrhea


Skin: Negative for: Rash


Neurological: Negative for: Weakness, Numbness, Dizziness





<Stacey Martinez - Last Filed: 18 19:04>





Physical Exam





- Physical Exam


Appears: Non-toxic, No Acute Distress


Skin: Warm, Dry


Head: Atraumatic, Normacephalic


Eye(s): bilateral: Normal Inspection


Oral Mucosa: Moist


Neck: Normal ROM, Supple


Chest: Symmetrical, No Tenderness


Cardiovascular: Rhythm Regular, No Murmur, No Other (Gallop)


Respiratory: Normal Breath Sounds (Clear to auscultation bilaterally ), No 

Decreased Breath Sounds, No Rales, No Rhonchi, No Wheezing


Gastrointestinal/Abdominal: Bowel Sounds (Active ), Soft, No Tenderness, No 

Distention, No Guarding, No Rebound


Extremity: Normal ROM, No Tenderness, No Pedal Edema


Extremity: Bilateral: Atraumatic, Normal Color And Temperature, Normal ROM


Pulses: Left Dorsalis Pedis: Normal, Right Dorsalis Pedis: Normal


Neurological/Psych: Oriented x3, Normal Speech, Normal Motor, Normal Sensation, 

Normal Reflexes, Other (No focal deficits )


Gait: Steady





<Stacey Martinez - Last Filed: 18 19:04>





ED Course And Treatment





- Laboratory Results


Result Diagrams: 


 18 17:29





 18 17:29


Lab Interpretation: No Acute Changes


ECG: Interpreted By Me, Viewed By Me


ECG Rhythm: Sinus Rhythm


ECG Interpretation: No Acute Changes, No Changes From Prior (18)


Rate From EC


O2 Sat by Pulse Oximetry: 98 (RA)


Pulse Ox Interpretation: Normal





- Radiology


CXR: Interpreted by Me, Viewed By Me


CXR Interpretation: Yes: No Acute Disease, Heart Size.  No: Infiltrates, COPD





<Stacey Martinez - Last Filed: 18 19:04>





- Laboratory Results


Result Diagrams: 


 18 17:29





 18 17:29





<Lea Driver - Last Filed: 18 20:23>





Medical Decision Making





<Stacey Martinez - Last Filed: 18 19:04>





<Lea Driver - Last Filed: 18 20:23>


Medical Decision Making: 


Impression:  chest pain





Plan:


Patient assessed and examined. EKG obtained and reviewed. Patient placed on 

cardiac monitor. Orders placed for labs including CE and CXR. ASA given. Oxygen 

given via nasal cannula. Ordered blood work and urinalysis.





Progress:


EKG shows no ST elevation. CXR shows no active disease. Labs reviewed shows 

negative cardiac enzymes, and CBC WNL. Patient remained afebrile alert and 

oriented with stable vital signs. Based on clinical presentation cannot exclude 

ACS. Recommend obs-tele admission. Contact PMD Dr Lucero who states he does not 

admit at this hospital, he is aware patient in ED. Will call medicine on call. 


[facility] medicine on call DR Morin [phone] with no call back


 (Stacey Martinez)





Disposition





- Disposition


Disposition Time: 19:04





- POA


Present On Arrival: None





<Stacey Martinez - Last Filed: 18 19:04>


Discussed With Dr.: Tarun Morin


Comment: accepted the pt on his service and took over the care at 8:20PM


Doctor Will See Patient In The: ED





- POA


Present On Arrival: Poor Glycemic Control





<Lea Driver - Last Filed: 18 20:23>





- Disposition


Disposition: HOSPITALIZED


Condition: FAIR


Instructions:  Chest Pain


Forms:  [facility] (English)





- Clinical Impression


Clinical Impression: 


 Chest pain








- PA / NP / Resident Statement


MD/DO has reviewed & agrees with the documentation as recorded.





- Scribe Statement


The provider has reviewed the documentation as recorded by the Scribe





<Stacey Martinez - Last Filed: 18 19:04>





<Lea Driver - Last Filed: 18 20:23>





- Scribe Statement





Stanley Cavazos





All medical record entries made by the Scribe were at my direction and 

personally dictated by me. I have reviewed the chart and agree that the record 

accurately reflects my personal performance of the history, physical exam, 

medical decision making, and the department course for this patient. I have 

also personally directed, reviewed, and agree with the discharge instructions 

and disposition. (Stacey Martinez)





Physician Patient Turnover


Patient Signed Over To: Lea Driver


Handoff Comments: pending call back from johann for admit obs-tele chest pain





<Stacey Martinez - Last Filed: 18 19:04>





Decision To Admit





<Stacey Martinez - Last Filed: 18 19:04>





- Pt Status Changed To:


Hospital Disposition Of: Observation





- .


Bed Request Type: Telemetry


Admitting Physician: Tarun Morin





<Lea Driver - Last Filed: 18 20:23>





- .


Patient Diagnosis: 


 Chest pain

## 2018-08-20 NOTE — RAD
HISTORY:

chest pain  



COMPARISON:

Chest x-ray performed 6/30/18 



TECHNIQUE:

Chest PA and lateral



FINDINGS:





LUNGS:

No focal consolidation.



Please note that chest x-ray has limited sensitivity for the 

detection of pulmonary masses.



PLEURA:

No significant pleural effusion identified. No definite pneumothorax .



CARDIOVASCULAR:

Heart size appears within normal limits.



OSSEOUS STRUCTURES:

Degenerative changes.



VISUALIZED UPPER ABDOMEN:

Eventration of the right hemidiaphragm.



OTHER FINDINGS:

Surgical clips noted at the level the right lung base/right upper 

quadrant.



IMPRESSION:

No focal consolidation, significant pleural effusion, or definite 

pneumothorax identified.

## 2018-08-20 NOTE — CP.PCM.HP
History of Present Illness





- History of Present Illness


History of Present Illness: 





COMPREHENSIVE   HISTORY & PHYSICAL EXAM 


   


HPI


Patient is admitted from [facility] emergency room for atypical chest 

pain.  Patient started experiencing retrosternal chest discomfort with no 

radiation diaphoresis the last 2 days his increase in intensity and severity.  

In the emergency room 1st set of cardiac enzymes is negative EKG shows no 

specific ST changes.


Patient has a history of hypertension and history of seizure disorder which was 

admitted beginning of this year and was on Lamictal and Keppra patient also has 

a history of hypercholesteremia





PAST HIST.


Abdominal surgery in the past


PERSONAL HIST:   Smoking.   N   Alcohol.   N      Allergy N            Travel_-

      .


FAMILY HIST : 


ROS :


Constitutional: Negative for weight change, chills, night sweats, fatigue and 

usage of assist device. 


  Eyes: Negative for redness, swelling, itching, discharge, vision changes, 

blurry vision, double vision, glaucoma, cataracts, 


  Ears: Negative for hearing loss, ringing, , tinnitus, vertigo


 Nose: Negative for rhinorrhea, stuffiness, sniffing, itching, postnasal drip, 

discoloration, nasal congestion and epistaxis. 


 Throat: Negative for throat clearing, sore throat, hoarseness, difficulty 

swallowing and difficulty speaking. 


  Respiratory: Negative for cough, chest tightness, sputum or phlegm, chronic 

cough, hemoptysis, wheezing, snoring at night, pleuritic chest pain and daytime 

somnolence. 


 Cardiovascular: Negative for, palpitations, orthopnea, PND, Edema of legs, leg 

cramps, angina, claudication, , irregular heartbeat,


 Neurology: Negative for irritability, muscle weakness, numbness and tingling, 

seizures, tremors, migraines, slurred speech, syncope, memory loss, mood changes

, recurrent headaches 


Gastrointestinal: Negative for difficulty swallowing, diarrhea, constipation, 

black stools, rectal bleeding, nausea, flatulence, reflux, poor appetite, 

changes in bowel habits, abdominal pain


  Genitourinary: Negative for frequent urination, hematuria, discharge, 

incontinence, urinary retention, frequent UTI, Psychiatric: Negative for 

depression, anxiety/panic, suicidal tendencies, 


Musculoskeletal: Negative for swollen joints, back pain, , neck pain, morning 

stiffness of joints, . 


 Skin: Negative for rash, ulcers, itching, dry skin and pigmented lesions.


P/E: 


  Constitutional: Appears stated age and in no apparent distress. 


 Head: Normocephalic. 


  Ears: External ear canals patent without inflammation. Tympanic membranes 

intact with normal light reflex and landmark. 


  Eyes: Pupils are central, bilaterally equal, symmetrical and reacts to light 

with normal movements and no icterus or pallor. 


 Nose: External nares are patent. Mucosa is pink  Mouth-Throat: Good general 

appearance and condition. No post-pharyngeal/oropharyngeal erythema and 

tonsillar hypertrophy. Good dental hygiene. 


  Neck-Lymphatic: Neck is supple with normal ROM, no thyromegaly, lymph nodes 

or masses. JVD is normal with no carotid bruit. 


  Lungs: Clear to percussion and auscultation with bilateral normal air entry. 


  Cardiovascular: S1 and S2 are normal with no murmurs, gallops and rub. 


  GI Exam: No hepatomegaly. Abdomen is soft and non-tender. No Organomegaly , 

masses or hernias are evident and bowel sounds are normal and active. 


  Neurology: Higher function and all cranial nerves intact, with no gross motor 

or sensory deficit. Superficial and deep reflexes are normal with downwards 

planters. No cerebellar deficit with normal gait. 


  Musculoskeletal: No tender spots with normal curvature of the spine with no 

swelling or restricted ROM of the small and large joints. 


  Extremities: Homans sign absent. Intact pulses with no pitting edema, calf 

tenderness or skin color changes. 


  Skin: No rash, eruptions or abnormal skin pigmentation





LAB/RADIOLOGY:


ASSESMENT :


Rule out acute coronary artery disease, #2 hypertension and seizure disorder.


Cardiac workup.





Present on Admission





- Present on Admission


Any Indicators Present on Admission: No





Past Patient History





- Past Medical History & Family History


Past Medical History?: Yes





- Past Social History


Smoking Status: Never Smoked





- CARDIAC


Hx Hypertension: Yes





- PULMONARY


Hx Respiratory Disorders: No





- NEUROLOGICAL


Hx Seizures: Yes





- HEENT


Hx HEENT Problems: No





- RENAL


Hx Chronic Kidney Disease: No





- ENDOCRINE/METABOLIC


Hx Endocrine Disorders: No





- HEMATOLOGICAL/ONCOLOGICAL


Hx Blood Disorders: No


Hx Cancer: Yes (colon cancer with liver metastasis)





- INTEGUMENTARY


Hx Dermatological Problems: No





- MUSCULOSKELETAL/RHEUMATOLOGICAL


Hx Falls: Yes (hx falls due to seizures)





- GASTROINTESTINAL


Hx Gastrointestinal Disorders: No


Hx Bowel Surgery: Yes (sigmoid colectomy)





- GENITOURINARY/GYNECOLOGICAL


Hx Genitourinary Disorders: No





- PSYCHIATRIC


Hx Substance Use: No





- SURGICAL HISTORY


Hx Surgeries: Yes





- ANESTHESIA


Hx Anesthesia: Yes





Meds


Allergies/Adverse Reactions: 


 Allergies











Allergy/AdvReac Type Severity Reaction Status Date / Time


 


seafood Allergy  SHORTNESS Uncoded 08/20/18 16:59





   OF BREATH  














Results





- Vital Signs


Recent Vital Signs: 





 Last Vital Signs











Temp  98.2 F   08/20/18 19:22


 


Pulse  68   08/20/18 19:22


 


Resp  20   08/20/18 19:22


 


BP  129/85   08/20/18 19:22


 


Pulse Ox  96   08/20/18 19:22














- Labs


Result Diagrams: 


 08/20/18 17:29





 08/20/18 17:29


Labs: 





 Laboratory Results - last 24 hr











  08/20/18 08/20/18 08/20/18





  17:29 17:29 17:29


 


WBC  7.6  


 


RBC  4.86  


 


Hgb  15.5  


 


Hct  44.4  


 


MCV  91.2  


 


MCH  31.8 H  


 


MCHC  34.8  


 


RDW  13.4  


 


Plt Count  190  


 


MPV  8.8  


 


Neut % (Auto)  73.8  


 


Lymph % (Auto)  15.0 L  


 


Mono % (Auto)  9.7  


 


Eos % (Auto)  0.8  


 


Baso % (Auto)  0.7  


 


Neut # (Auto)  5.6  


 


Lymph # (Auto)  1.1  


 


Mono # (Auto)  0.7  


 


Eos # (Auto)  0.1  


 


Baso # (Auto)  0.1  


 


PT   12.0 


 


INR   1.1 


 


APTT   30 


 


Sodium    142


 


Potassium    3.8


 


Chloride    103


 


Carbon Dioxide    29


 


Anion Gap    14


 


BUN    14


 


Creatinine    0.8


 


Est GFR ( Amer)    > 60


 


Est GFR (Non-Af Amer)    > 60


 


Random Glucose    117 H


 


Calcium    9.5


 


Total Bilirubin    0.8


 


AST    100 H D


 


ALT    91 H D


 


Alkaline Phosphatase    83


 


Total Creatine Kinase    285 H


 


CK-MB (Mass)    2.80


 


Troponin I    < 0.0120


 


NT-Pro-B Natriuret Pep    96.4


 


Total Protein    8.2


 


Albumin    4.4


 


Globulin    3.8


 


Albumin/Globulin Ratio    1.2


 


Triglycerides    148


 


Cholesterol    175


 


LDL Cholesterol Direct    91


 


HDL Cholesterol    55


 


Urine Color   


 


Urine Clarity   


 


Urine pH   


 


Ur Specific Gravity   


 


Urine Protein   


 


Urine Glucose (UA)   


 


Urine Ketones   


 


Urine Blood   


 


Urine Nitrate   


 


Urine Bilirubin   


 


Urine Urobilinogen   


 


Ur Leukocyte Esterase   


 


Urine WBC (Auto)   


 


Urine RBC (Auto)   


 


Ur Squamous Epith Cells   


 


Ur Transition Epith Cell   


 


Urine Bacteria   


 


Hyaline Casts   














  08/20/18





  19:47


 


WBC 


 


RBC 


 


Hgb 


 


Hct 


 


MCV 


 


MCH 


 


MCHC 


 


RDW 


 


Plt Count 


 


MPV 


 


Neut % (Auto) 


 


Lymph % (Auto) 


 


Mono % (Auto) 


 


Eos % (Auto) 


 


Baso % (Auto) 


 


Neut # (Auto) 


 


Lymph # (Auto) 


 


Mono # (Auto) 


 


Eos # (Auto) 


 


Baso # (Auto) 


 


PT 


 


INR 


 


APTT 


 


Sodium 


 


Potassium 


 


Chloride 


 


Carbon Dioxide 


 


Anion Gap 


 


BUN 


 


Creatinine 


 


Est GFR ( Amer) 


 


Est GFR (Non-Af Amer) 


 


Random Glucose 


 


Calcium 


 


Total Bilirubin 


 


AST 


 


ALT 


 


Alkaline Phosphatase 


 


Total Creatine Kinase 


 


CK-MB (Mass) 


 


Troponin I 


 


NT-Pro-B Natriuret Pep 


 


Total Protein 


 


Albumin 


 


Globulin 


 


Albumin/Globulin Ratio 


 


Triglycerides 


 


Cholesterol 


 


LDL Cholesterol Direct 


 


HDL Cholesterol 


 


Urine Color  Shannan


 


Urine Clarity  Hazy


 


Urine pH  5.0


 


Ur Specific Gravity  1.029


 


Urine Protein  2+ H


 


Urine Glucose (UA)  Normal


 


Urine Ketones  Negative


 


Urine Blood  1+ H


 


Urine Nitrate  Negative


 


Urine Bilirubin  Negative


 


Urine Urobilinogen  2.0


 


Ur Leukocyte Esterase  Neg


 


Urine WBC (Auto)  3


 


Urine RBC (Auto)  4 H


 


Ur Squamous Epith Cells  1


 


Ur Transition Epith Cell  < 1


 


Urine Bacteria  Rare


 


Hyaline Casts  6-10 H

## 2018-08-21 LAB
CK MB SERPL-MCNC: 1.97 NG/ML (ref 0–3.38)
CK MB SERPL-MCNC: 2.29 NG/ML (ref 0–3.38)
TROPONIN I SERPL-MCNC: 0.01 NG/ML (ref 0–0.12)
TROPONIN I SERPL-MCNC: 0.02 NG/ML (ref 0–0.12)

## 2018-08-21 NOTE — CARD
--------------- APPROVED REPORT --------------





Date of service: 08/20/2018



EKG Measurement

Heart [id number]

DC 138P14

[id number]

[id number]

[id number]



<Conclusion>

Normal sinus rhythm

Voltage criteria for left ventricular hypertrophy

Abnormal ECG

## 2018-08-21 NOTE — CARD
--------------- APPROVED REPORT --------------





Date of service: 08/21/2018



EXAM: Two-dimensional and M-mode echocardiogram with Doppler and 

color Doppler.



Other Information 

Quality : GoodRhythm : 



INDICATION

Chest Pain 



RISK FACTORS

Hypertension 



2D DIMENSIONS 

IVSd1.1   (0.7-1.1cm)LVDd5.2   (3.9-5.9cm)

PWd1.1   (0.7-1.1cm)LVDs3.4   (2.5-4.0cm)

FS (%) 34.8   %LVEF (%)63.6   (>50%)



M-Mode DIMENSIONS 

Left Atrium (MM)4.60   (2.5-4.0cm)IVSd1.16   (0.7-1.1cm)

Aortic Root3.67   (2.2-3.7cm)LVDd5.94   (4.0-5.6cm)

Aortic Cusp Exc.2.32   (1.5-2.0cm)PWd1.07   (0.7-1.1cm)

FS (%) 43   %LVDs3.39   (2.0-3.8cm)

LVEF (%)73   (>50%)



Mitral Valve

MV E Ebmyzdoh02.6cm/sMV A Mpsrrkfz87.9cm/sE/A ratio1.4



TDI

E/Lateral E'0.0E/Medial E'0.0



Tricuspid Valve

TR Peak [id number]/sTR Peak [id number]



 LEFT VENTRICLE 

The left ventricle is normal size.

There is normal left ventricular wall thickness. 

The left ventricular function is normal.

The left ventricular ejection fraction is within the normal range. 

About 55%

No regional wall motion abnormalities noted.

The left ventricular diastolic function is normal.

No left ventricle thrombus noted on this study.

There is no ventricular septal defect visualized.

There is no left ventricular aneurysm. 

There is no mass noted in the left ventricle.



 RIGHT VENTRICLE 

The right ventricle is normal size.

There is normal right ventricular wall thickness.

The right ventricular systolic function is normal.



 ATRIA 

The left atrium size is normal.

The right atrium size is normal.

The interatrial septum is intact with no evidence for an atrial 

septal defect.



 AORTIC VALVE 

The aortic valve is normal in structure and function.

No aortic regurgitation is present. 

There is no aortic valvular stenosis. 

There is no aortic valvular vegetation.



 MITRAL VALVE 

The mitral valve is normal in structure and function.

There is no evidence of mitral valve prolapse.

There is no mitral valve stenosis. 

There is trace mitral valve regurgitation noted.



 TRICUSPID VALVE 

The tricuspid valve is normal in structure and function.

There is no tricuspid valve regurgitation noted.

There is no tricuspid valve prolapse or vegetation.

There is no tricuspid valve stenosis. 



 PULMONIC VALVE 

The pulmonary valve is normal in structure and function.

There is no pulmonic valvular regurgitation. 

There is no pulmonic valvular stenosis.



 GREAT VESSELS 

The aortic root is normal in size.

The ascending aorta is normal in size.

The pulmonary artery is normal.

The IVC is normal in size and collapses >50% with inspiration.



 PERICARDIAL EFFUSION 

The pericardium appears normal.

There is no pleural effusion.



<Conclusion>

Normal LV systolic function and wall motion.

Normal Doppler.

## 2018-08-21 NOTE — CP.PCM.PN
Subjective





- Date & Time of Evaluation


Date of Evaluation: 08/21/18


Time of Evaluation: 13:30





- Subjective


Subjective: 





CHIEF COMPLAINTS TODAY :


No further chest pain.


Remains in sinus bradycardia





ROS.


HEENT :  N.


Resp :       No cough, wheezing ,pleuritic CP ,or hemoptysis 


Cardio :     No anginal  CP, PND, orthopnea, palpitation 


GI :           No abd.pain, n/v ,diarrhea or GI bleeding .


CNS : No headache, vertigo, focal deficit.


Musculoskel :  No joint swelling ,


Derm :        No rash 


Psych :     Normal affect.


Ext :  No  swelling ,calf pain 





PE.


Pt. is alert awake in no distress.


V.S  As noted in the chart 


Head ,ear nose,throat and eyes : Normal.


Neck : Supple with normal carotids.


Lungs: Clear air entry.


Heart : S1 & S2 normal with S4. No murmur.


Abd : Soft non tender with normal bowel sounds.


Neuro : Moves all ext. with no localized deficit.


Ext : No edema with intact pulses.Non tender calves 


Derm : No rashes or decubitus ulcer.





LABS/RADIOLOGY:





ASSESSMENT/PLAN : 


Cardiac workup in progress











Objective





- Vital Signs/Intake and Output


Vital Signs (last 24 hours): 


 











Temp Pulse Resp BP Pulse Ox


 


 97.6 F   48 L  20   157/88 H  99 


 


 08/21/18 07:00  08/21/18 12:00  08/21/18 07:00  08/21/18 07:00  08/21/18 12:14








Intake and Output: 


 











 08/21/18 08/21/18





 11:59 23:59


 


Intake Total 10 


 


Balance 10 














- Medications


Medications: 


 Current Medications





Amlodipine Besylate (Norvasc)  10 mg PO DAILY [facility]


   Last Admin: 08/21/18 09:23 Dose:  10 mg


Aspirin (Aspirin)  325 mg PO DAILY [facility]


   Last Admin: 08/21/18 09:23 Dose:  325 mg


Heparin Sodium (Porcine) (Heparin)  5,000 units SC BID [facility]


   Last Admin: 08/21/18 09:23 Dose:  5,000 units


Lamotrigine (Lamictal)  150 mg PO DAILY [facility]


   Last Admin: 08/21/18 09:23 Dose:  150 mg


Levetiracetam (Keppra)  1,500 mg PO BID [facility]


   Last Admin: 08/21/18 09:23 Dose:  1,500 mg


Rosuvastatin Calcium (Crestor)  5 mg PO [facility]











- Labs


Labs: 


 





 08/20/18 17:29 





 08/20/18 17:29 





 











PT  12.0 SECONDS (9.7-12.2)   08/20/18  17:29    


 


INR  1.1   08/20/18  17:29    


 


APTT  30 SECONDS (21-34)   08/20/18  17:29

## 2018-08-22 VITALS
DIASTOLIC BLOOD PRESSURE: 91 MMHG | RESPIRATION RATE: 20 BRPM | OXYGEN SATURATION: 99 % | TEMPERATURE: 98 F | HEART RATE: 53 BPM | SYSTOLIC BLOOD PRESSURE: 172 MMHG

## 2018-08-22 LAB
ALBUMIN SERPL-MCNC: 3.9 G/DL (ref 3.5–5)
ALBUMIN/GLOB SERPL: 1.1 {RATIO} (ref 1–2.1)
ALT SERPL-CCNC: 86 U/L (ref 21–72)
AST SERPL-CCNC: 107 U/L (ref 17–59)
BASOPHILS # BLD AUTO: 0.1 K/UL (ref 0–0.2)
BASOPHILS NFR BLD: 1.1 % (ref 0–2)
BUN SERPL-MCNC: 14 MG/DL (ref 9–20)
CALCIUM SERPL-MCNC: 9.2 MG/DL (ref 8.6–10.4)
EOSINOPHIL # BLD AUTO: 0.1 K/UL (ref 0–0.7)
EOSINOPHIL NFR BLD: 2.6 % (ref 0–4)
ERYTHROCYTE [DISTWIDTH] IN BLOOD BY AUTOMATED COUNT: 13.1 % (ref 11.5–14.5)
GFR NON-AFRICAN AMERICAN: > 60
HGB BLD-MCNC: 15.2 G/DL (ref 12–18)
LYMPHOCYTES # BLD AUTO: 1.5 K/UL (ref 1–4.3)
LYMPHOCYTES NFR BLD AUTO: 27.3 % (ref 20–40)
MCH RBC QN AUTO: 31.4 PG (ref 27–31)
MCHC RBC AUTO-ENTMCNC: 34.5 G/DL (ref 33–37)
MCV RBC AUTO: 90.8 FL (ref 80–94)
MONOCYTES # BLD: 0.6 K/UL (ref 0–0.8)
MONOCYTES NFR BLD: 10.5 % (ref 0–10)
NEUTROPHILS # BLD: 3.3 K/UL (ref 1.8–7)
NEUTROPHILS NFR BLD AUTO: 58.5 % (ref 50–75)
NRBC BLD AUTO-RTO: 0.1 % (ref 0–2)
PLATELET # BLD: 160 K/UL (ref 130–400)
PMV BLD AUTO: 9 FL (ref 7.2–11.7)
RBC # BLD AUTO: 4.86 MIL/UL (ref 4.4–5.9)
WBC # BLD AUTO: 5.7 K/UL (ref 4.8–10.8)

## 2018-08-22 NOTE — CARD
--------------- APPROVED REPORT --------------





Date of service: 08/21/2018



EKG Measurement

Heart [id number]

VT 150P31

[id number]

[id number]

[id number]



<Conclusion>

Marked sinus bradycardia

Voltage criteria for left ventricular hypertrophy

Nonspecific ST abnormality

Abnormal ECG

## 2018-08-22 NOTE — CP.PCM.PN
Subjective





- Date & Time of Evaluation


Date of Evaluation: 08/22/18


Time of Evaluation: 14:26





- Subjective


Subjective: 





Patient is admitted from [facility] emergency room for atypical chest 

pain.  Patient started experiencing retrosternal chest discomfort with no 

radiation diaphoresis the last 2 days his increase in intensity and severity.  

In the emergency room 1st set of cardiac enzymes is negative EKG shows no 

specific ST changes.


Patient has a history of hypertension and history of seizure disorder which was 

admitted beginning of this year and was on Lamictal and Keppra patient also has 

a history of hypercholesteremia





patient was monitored on telemetry bed.  There was no any car3 sets of cardiac 

enzymes TNI were negative.


Echocardiogram showed normal left ejection fraction with no wall motion normal.


Patient currently has no chest pain on rest and on exertion.


We will discharge the patient stress test outpatient.








Objective





- Vital Signs/Intake and Output


Vital Signs (last 24 hours): 


 











Temp Pulse Resp BP Pulse Ox


 


 97.9 F   44 L  18   163/93 H  98 


 


 08/22/18 07:00  08/22/18 07:56  08/22/18 07:00  08/22/18 07:00  08/22/18 07:00








Intake and Output: 


 











 08/22/18 08/22/18





 11:59 23:59


 


Intake Total 200 


 


Balance 200 














- Medications


Medications: 


 Current Medications





Amlodipine Besylate (Norvasc)  10 mg PO DAILY [facility]


   Last Admin: 08/22/18 09:30 Dose:  10 mg


Aspirin (Aspirin)  325 mg PO DAILY [facility]


   Last Admin: 08/22/18 09:30 Dose:  325 mg


Heparin Sodium (Porcine) (Heparin)  5,000 units SC BID [facility]


   Last Admin: 08/22/18 09:31 Dose:  5,000 units


Lamotrigine (Lamictal)  150 mg PO DAILY [facility]


   Last Admin: 08/22/18 09:30 Dose:  150 mg


Levetiracetam (Keppra)  1,500 mg PO BID [facility]


   Last Admin: 08/22/18 09:30 Dose:  1,500 mg


Rosuvastatin Calcium (Crestor)  5 mg PO HS [facility]


   Last Admin: 08/21/18 21:47 Dose:  5 mg











- Labs


Labs: 


 





 08/22/18 07:14 





 08/22/18 07:14 





 











PT  12.0 SECONDS (9.7-12.2)   08/20/18  17:29    


 


INR  1.1   08/20/18  17:29    


 


APTT  30 SECONDS (21-34)   08/20/18  17:29

## 2018-08-22 NOTE — CP.PCM.PN
Subjective





- Date & Time of Evaluation


Date of Evaluation: 08/22/18


Time of Evaluation: 14:36





- Subjective


Subjective: 


PT CLEARED FOR D/C PER DR. ESTEVES.  PT TO F/U WITH HIM IN THE OFFICE WITHIN 5-7 

DAYS FOR A STRESS TEST.  PT WILL ALSO F/U WITH HIS NEURO AND PMD.  REFILL RX 

CALLED  DIRECTLY TO PT'S PHARMACY.  IT SHOULD BE NOTED THAT PT IS UNCLEAR WHAT 

MEDS HE'S ON---PT EDUCATED ON THE IMPORTANCE OF MEDICATION AWARENESS AND SELF-

CARE.  ADDRESSED ALL CONCERNS. NO FURTHER ORDERS.





-FOLLOW UP WITH DR. ESTEVES IN THE OFFICE WITHIN 5-7 DAYS---CALL THE OFFICE FOR 

AN APPOINTMENT TIME.


-DURING YOUR APPOINTMENT WITH DR. ESTEVES, HE WILL DISCUSS WITH YOU PLANS FOR A 

STRESS TEST.


-CONTINUE YOUR HOME MEDICATIONS AS USUAL.


-REFILL PRESCRIPTIONS SENT TO YOUR PHARMACY---PICK THEM UP TODAY OR TOMORROW:


 1) AMLODIPINE 10 MG (FOR YOUR HEART AND BLOOD PRESSURE) ---TAKE 1 TABLET BY 

MOUTH ONCE A DAY


 2) SIMVASTATIN 20 MG (FOR YOUR CHOLESTEROL AND HEART)---TAKE 1 TABLET BY MOUTH 

AT BEDSIDE


 3) KEPPRA 500 MG ---TAKE 1 TABLET BY MOUTH TWICE A DAY


-FOR FURTHER CONCERNS OR QUESTIONS, CONTACT DR. ESTEVES'S OFFICE. 





Objective





- Vital Signs/Intake and Output


Vital Signs (last 24 hours): 


 











Temp Pulse Resp BP Pulse Ox


 


 97.9 F   44 L  18   163/93 H  98 


 


 08/22/18 07:00  08/22/18 07:56  08/22/18 07:00  08/22/18 07:00  08/22/18 07:00








Intake and Output: 


 











 08/22/18 08/22/18





 06:59 18:59


 


Intake Total 200 


 


Balance 200 














- Medications


Medications: 


 Current Medications





Amlodipine Besylate (Norvasc)  10 mg PO DAILY [facility]


   Last Admin: 08/22/18 09:30 Dose:  10 mg


Aspirin (Aspirin)  325 mg PO DAILY [facility]


   Last Admin: 08/22/18 09:30 Dose:  325 mg


Heparin Sodium (Porcine) (Heparin)  5,000 units SC BID [facility]


   Last Admin: 08/22/18 09:31 Dose:  5,000 units


Lamotrigine (Lamictal)  150 mg PO DAILY [facility]


   Last Admin: 08/22/18 09:30 Dose:  150 mg


Levetiracetam (Keppra)  1,500 mg PO BID [facility]


   Last Admin: 08/22/18 09:30 Dose:  1,500 mg


Rosuvastatin Calcium (Crestor)  5 mg PO [facility]


   Last Admin: 08/21/18 21:47 Dose:  5 mg











- Labs


Labs: 


 





 08/22/18 07:14 





 08/22/18 07:14 





 











PT  12.0 SECONDS (9.7-12.2)   08/20/18  17:29    


 


INR  1.1   08/20/18  17:29    


 


APTT  30 SECONDS (21-34)   08/20/18  17:29

## 2018-09-16 ENCOUNTER — HOSPITAL ENCOUNTER (INPATIENT)
Dept: HOSPITAL 31 - C.ER | Age: 58
LOS: 2 days | Discharge: HOME | DRG: 101 | End: 2018-09-18
Attending: FAMILY MEDICINE | Admitting: FAMILY MEDICINE
Payer: COMMERCIAL

## 2018-09-16 VITALS — BODY MASS INDEX: 31.2 KG/M2

## 2018-09-16 DIAGNOSIS — G40.909: Primary | ICD-10-CM

## 2018-09-16 DIAGNOSIS — F10.10: ICD-10-CM

## 2018-09-16 DIAGNOSIS — Z91.14: ICD-10-CM

## 2018-09-16 DIAGNOSIS — I10: ICD-10-CM

## 2018-09-16 DIAGNOSIS — I45.19: ICD-10-CM

## 2018-09-16 DIAGNOSIS — E87.0: ICD-10-CM

## 2018-09-16 DIAGNOSIS — E78.00: ICD-10-CM

## 2018-09-16 DIAGNOSIS — Z87.891: ICD-10-CM

## 2018-09-16 DIAGNOSIS — Z85.038: ICD-10-CM

## 2018-09-16 DIAGNOSIS — E87.2: ICD-10-CM

## 2018-09-16 DIAGNOSIS — K21.9: ICD-10-CM

## 2018-09-16 DIAGNOSIS — D49.6: ICD-10-CM

## 2018-09-16 LAB
ALBUMIN SERPL-MCNC: 5.3 G/DL (ref 3.5–5)
ALBUMIN/GLOB SERPL: 1.2 {RATIO} (ref 1–2.1)
ALT SERPL-CCNC: 179 U/L (ref 21–72)
APTT BLD: 30 SECONDS (ref 21–34)
AST SERPL-CCNC: 161 U/L (ref 17–59)
BACTERIA #/AREA URNS HPF: (no result) /[HPF]
BASOPHILS # BLD AUTO: 0.1 K/UL (ref 0–0.2)
BASOPHILS NFR BLD: 0.9 % (ref 0–2)
BILIRUB UR-MCNC: NEGATIVE MG/DL
BUN SERPL-MCNC: 9 MG/DL (ref 9–20)
CALCIUM SERPL-MCNC: 10.8 MG/DL (ref 8.6–10.4)
EOSINOPHIL # BLD AUTO: 0.3 K/UL (ref 0–0.7)
EOSINOPHIL NFR BLD: 2.8 % (ref 0–4)
ERYTHROCYTE [DISTWIDTH] IN BLOOD BY AUTOMATED COUNT: 13.4 % (ref 11.5–14.5)
GFR NON-AFRICAN AMERICAN: > 60
GLUCOSE UR STRIP-MCNC: NORMAL MG/DL
HDLC SERPL-MCNC: 71 MG/DL (ref 30–70)
HGB BLD-MCNC: 17 G/DL (ref 12–18)
HYALINE CASTS #/AREA URNS LPF: (no result) /LPF (ref 0–2)
INR PPP: 1
LDLC SERPL-MCNC: 118 MG/DL (ref 0–129)
LEUKOCYTE ESTERASE UR-ACNC: (no result) LEU/UL
LYMPHOCYTES # BLD AUTO: 4 K/UL (ref 1–4.3)
LYMPHOCYTES NFR BLD AUTO: 35.4 % (ref 20–40)
MCH RBC QN AUTO: 31.2 PG (ref 27–31)
MCHC RBC AUTO-ENTMCNC: 32.9 G/DL (ref 33–37)
MCV RBC AUTO: 94.9 FL (ref 80–94)
MONOCYTES # BLD: 1.6 K/UL (ref 0–0.8)
MONOCYTES NFR BLD: 14.3 % (ref 0–10)
NEUTROPHILS # BLD: 5.3 K/UL (ref 1.8–7)
NEUTROPHILS NFR BLD AUTO: 46.6 % (ref 50–75)
NRBC BLD AUTO-RTO: 0.1 % (ref 0–2)
PH UR STRIP: 5 [PH] (ref 5–8)
PLATELET # BLD: 255 K/UL (ref 130–400)
PMV BLD AUTO: 8.7 FL (ref 7.2–11.7)
PROT UR STRIP-MCNC: (no result) MG/DL
PROTHROMBIN TIME: 11.2 SECONDS (ref 9.7–12.2)
RBC # BLD AUTO: 5.44 MIL/UL (ref 4.4–5.9)
RBC # UR STRIP: (no result) /UL
SP GR UR STRIP: 1.02 (ref 1–1.03)
URINE AMORPHOUS SEDIMENT: (no result) /UL
UROBILINOGEN UR-MCNC: NORMAL MG/DL (ref 0.2–1)
WBC # BLD AUTO: 11.3 K/UL (ref 4.8–10.8)

## 2018-09-16 NOTE — CT
Date of service: 



09/16/2018



PROCEDURE:  CT HEAD WITHOUT CONTRAST.



HISTORY:

seizure, L sided weakness, h/o tumor resection



COMPARISON:

None available.



TECHNIQUE:

Axial computed tomography images were obtained through the head/brain 

without intravenous contrast.  



Radiation dose:



Total exam DLP = 1025.4 mGy-cm.



This CT exam was performed using one or more of the following dose 

reduction techniques: Automated exposure control, adjustment of the 

mA and/or kV according to patient size, and/or use of iterative 

reconstruction technique.



FINDINGS:



HEMORRHAGE:

No intracranial hemorrhage. 



BRAIN:

No mass effect or edema. Mild atrophy. Chronic microvascular ischemic 

changes. Right parieto-occipital region encephalomalacia subjacent to 

craniotomy. 



VENTRICLES:

Unremarkable. No hydrocephalus. 



CALVARIUM:

Right parieto-occipital craniotomy.



PARANASAL SINUSES:

Bifrontal and left maxillary sinus mucosal thickening.



MASTOID AIR CELLS:

Unremarkable as visualized. No inflammatory changes.



OTHER FINDINGS:

None.



IMPRESSION:

No acute intracranial pathology.  



Right parietal occipital region encephalomalacia subjacent to prior 

craniotomy. Please note, there is limited assessment in the absence 

of intravenous contrast. Contrast-enhanced MRI would better evaluate 

for residual tumor in this region.  



Findings conveyed to Dr. Faulkner by Dr. Livingston at 5:32 p.m. on 09/16/2018.

## 2018-09-16 NOTE — CP.PCM.HP
Addendum entered and electronically signed by Israel Rodas DO  09/17/18 05:51: 





Patient is now AAO x3 and offering more information.





PMHx: Colon CA (remission s/p resection and chemotherapy), Benign brain tumor 

status post resection


PSHx: Brain Tumor Resection, Colectomy, 





Meds: Norvasc 10mg Daily, Simvastatin 20mg HS, Keppra 500mg BID, Lamictal (

Unspecified dose), Omeprazole 40mg Daily.





PMD: Dr. Elder Lucero.





Patient entered under incorrect M.R #.  Please see Medical Record Number: 

[id number] for history.  











Original Note:








<Israel Rodas - Last Filed: 09/17/18 00:15>





History of Present Illness





- History of Present Illness


History of Present Illness: 








Mr. Moreno is a 58 year old male with a past medical history of brain tumor (s/p 

brain tumor resection in 2015) who presents to the ED with complaints of a 

seizure episode prior to coming to the hospital.  Patient states he was aware 

that the seizure was about the occur because he began to feel dizzy.  Patient 

had 1 episode of a tonic-clonic seizure in the ED lasting  about 1 minute and 

treated with Ativan per E.D note.  A loading dose of Keppra was given in the 

E.D due to history of brain tumor s/p resection.  Patient denies any fever, 

chills, urinary/bowel incontinence, tongue biting, vision changes, chest pain, 

shortness of breath, abdominal pain, vomiting, changes in bowel habits, or 

urinary symptoms.  Patient does admit to headache, diffuse body aches, and 

nausea.  Of note, patient's history and review of systems may be unreliable due 

to patient's lethargy and lack of alertness. More detailed history and review 

of systems unobtainable due to patient's lethargy.   





ROS: As stated above





PMHx: Brain Tumor


PSHx: Brain Tumor Resection


Allergies: NKDA


SocialHx: Denies tobacco use, drinks about 6-7 24oz beers daily, Denies Illicit 

drug use


FamHx: Non-Cont. 


Meds: Patient states he cannot remember the names of his medications.  He does 

remember that he takes Keppra.   














Present on Admission





- Present on Admission


Any Indicators Present on Admission: No





Review of Systems





- Review of Systems


All systems: reviewed and no additional remarkable complaints except (As per HPI

)


Review of Systems: 


As per HPI











Past Patient History





- Past Social History


Smoking Status: Never Smoked





- CARDIAC


Hx Hypercholesterolemia: Yes


Hx Hypertension: Yes





- NEUROLOGICAL


Hx Seizures: Yes





- HEMATOLOGICAL/ONCOLOGICAL


Hx Blood Disorders: Yes


Hx Cancer: Yes





- GASTROINTESTINAL


Hx Gastrointestinal Disorders: Yes


Hx Bowel Surgery: Yes (COLON)





- PSYCHIATRIC


Hx Substance Use: No





- SURGICAL HISTORY


Hx Surgeries: Yes


Other/Comment: COLON SX, BRAIN TUMOR EXCISION





Meds


Allergies/Adverse Reactions: 


 Allergies











Allergy/AdvReac Type Severity Reaction Status Date / Time


 


No Known Allergies Allergy   Verified 09/16/18 17:27














Physical Exam





- Constitutional


Appears: Non-toxic, No Acute Distress


Additional comments: 


lethargy











- Head Exam


Head Exam: ATRAUMATIC, NORMAL INSPECTION, NORMOCEPHALIC





- Eye Exam


Eye Exam: EOMI, PERRL.  absent: Scleral icterus


Pupil Exam: absent: Miosis, Mydriatic





- ENT Exam


ENT Exam: Mucous Membranes Moist





- Neck Exam


Neck exam: Positive for: Normal Inspection





- Respiratory Exam


Respiratory Exam: Clear to Auscultation Bilateral, NORMAL BREATHING PATTERN





- Cardiovascular Exam


Cardiovascular Exam: RRR, +S1, +S2





- GI/Abdominal Exam


GI & Abdominal Exam: Normal Bowel Sounds, Soft.  absent: Tenderness





- Extremities Exam


Extremities exam: Positive for: normal capillary refill, normal inspection.  

Negative for: pedal edema





- Neurological Exam


Neurological exam: Alert, Oriented x3





Results





- Vital Signs


Recent Vital Signs: 





 Last Vital Signs











Temp      


 


Pulse  90   09/16/18 18:19


 


Resp  18   09/16/18 18:19


 


BP  120/79   09/16/18 18:19


 


Pulse Ox  97   09/16/18 18:57














- Labs


Result Diagrams: 


 09/16/18 17:26





 09/16/18 17:26


Labs: 





 Laboratory Results - last 24 hr











  09/16/18 09/16/18 09/16/18





  17:26 17:26 17:26


 


WBC  11.3 H  


 


RBC  5.44  


 


Hgb  17.0  


 


Hct  51.6 H  


 


MCV  94.9 H  


 


MCH  31.2 H  


 


MCHC  32.9 L  


 


RDW  13.4  


 


Plt Count  255  


 


MPV  8.7  


 


Neut % (Auto)  46.6 L  


 


Lymph % (Auto)  35.4  


 


Mono % (Auto)  14.3 H  


 


Eos % (Auto)  2.8  


 


Baso % (Auto)  0.9  


 


Neut # (Auto)  5.3  


 


Lymph # (Auto)  4.0  


 


Mono # (Auto)  1.6 H  


 


Eos # (Auto)  0.3  


 


Baso # (Auto)  0.1  


 


PT   11.2 


 


INR   1.0 


 


APTT   30 


 


Sodium    152 H


 


Potassium    4.5


 


Chloride    106


 


Carbon Dioxide    13 L


 


Anion Gap    37 H


 


BUN    9


 


Creatinine    1.1


 


Est GFR ( Amer)    > 60


 


Est GFR (Non-Af Amer)    > 60


 


Random Glucose    160 H


 


Calcium    10.8 H


 


Total Bilirubin    0.6


 


AST    161 H


 


ALT    179 H


 


Alkaline Phosphatase    86


 


Troponin I    < 0.0120


 


Total Protein    9.7 H


 


Albumin    5.3 H


 


Globulin    4.3 H


 


Albumin/Globulin Ratio    1.2


 


Triglycerides    235 H


 


Cholesterol    237 H


 


LDL Cholesterol Direct    118


 


HDL Cholesterol    71 H


 


Urine Color   


 


Urine Clarity   


 


Urine pH   


 


Ur Specific Gravity   


 


Urine Protein   


 


Urine Glucose (UA)   


 


Urine Ketones   


 


Urine Blood   


 


Urine Nitrate   


 


Urine Bilirubin   


 


Urine Urobilinogen   


 


Ur Leukocyte Esterase   


 


Urine WBC (Auto)   


 


Urine RBC (Auto)   


 


Amorphous Sediment   


 


Urine Bacteria   


 


Hyaline Casts   


 


Urine Opiates Screen   


 


Urine Methadone Screen   


 


Ur Barbiturates Screen   


 


Ur Phencyclidine Scrn   


 


Ur Amphetamines Screen   


 


U Benzodiazepines Scrn   


 


U Oth Cocaine Metabols   


 


U Cannabinoids Screen   


 


Alcohol, Quantitative    14 H


 


Blood Type   


 


Antibody Screen   














  09/16/18 09/16/18 09/16/18





  17:26 18:14 18:14


 


WBC   


 


RBC   


 


Hgb   


 


Hct   


 


MCV   


 


MCH   


 


MCHC   


 


RDW   


 


Plt Count   


 


MPV   


 


Neut % (Auto)   


 


Lymph % (Auto)   


 


Mono % (Auto)   


 


Eos % (Auto)   


 


Baso % (Auto)   


 


Neut # (Auto)   


 


Lymph # (Auto)   


 


Mono # (Auto)   


 


Eos # (Auto)   


 


Baso # (Auto)   


 


PT   


 


INR   


 


APTT   


 


Sodium   


 


Potassium   


 


Chloride   


 


Carbon Dioxide   


 


Anion Gap   


 


BUN   


 


Creatinine   


 


Est GFR ( Amer)   


 


Est GFR (Non-Af Amer)   


 


Random Glucose   


 


Calcium   


 


Total Bilirubin   


 


AST   


 


ALT   


 


Alkaline Phosphatase   


 


Troponin I   


 


Total Protein   


 


Albumin   


 


Globulin   


 


Albumin/Globulin Ratio   


 


Triglycerides   


 


Cholesterol   


 


LDL Cholesterol Direct   


 


HDL Cholesterol   


 


Urine Color   Yellow 


 


Urine Clarity   Clear 


 


Urine pH   5.0 


 


Ur Specific Gravity   1.017 


 


Urine Protein   2+ H 


 


Urine Glucose (UA)   Normal 


 


Urine Ketones   Negative 


 


Urine Blood   2+ H 


 


Urine Nitrate   Negative 


 


Urine Bilirubin   Negative 


 


Urine Urobilinogen   Normal 


 


Ur Leukocyte Esterase   Neg 


 


Urine WBC (Auto)   1 


 


Urine RBC (Auto)   12 H 


 


Amorphous Sediment   Moderate H 


 


Urine Bacteria   Rare 


 


Hyaline Casts   6-10 H 


 


Urine Opiates Screen    Positive H


 


Urine Methadone Screen    Negative


 


Ur Barbiturates Screen    Negative


 


Ur Phencyclidine Scrn    Negative


 


Ur Amphetamines Screen    Negative


 


U Benzodiazepines Scrn    Negative


 


U Oth Cocaine Metabols    Negative


 


U Cannabinoids Screen    Negative


 


Alcohol, Quantitative   


 


Blood Type  A POSITIVE  


 


Antibody Screen  Negative  














Assessment & Plan





- Assessment and Plan (Free Text)


Assessment: 


Mr. Moreno is a 58 year old male with a past medical history of brain tumor (s/p 

brain tumor resection in 2015) who presents to the ED with complaints of a 

seizure episode prior to coming to the hospital.  Patient admitted for 

evaluation and treatment of seizure.





Plan: 





Seizure


2/2 of Hx of Brain Tumor Resection vs Alcohol Withdrawal.


CT Head (Adm): No acute intracranial pathology. Right parietal occipital region 

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 Please note, there is limited 

assessment in the absence of intravenous contrast. Contrast-enhanced MRI would 

better evaluate for residual tumor in this region.  


CTA Head/Neck (Adm): No Acute pathology


EKG


ED Course: Ativan during seizure, Keppra 1500mg 





Keppra 500mg Q12H IV


Neurology Consult (Dr. Sutherland)


Seizure Precautions. 


Neurochecks, HOB elevated to 45 Degrees, 


Swallow Eval


[facility]WA Protocol





EtoH Abuse/Withdrawal


Alcohol Lvl 14, UDS Positive for Opiates. 


CIWA protocol


Banana Bag


Ativan 2mg Q4H NICK/ 2mg Q2H PRN 





Hypernatremia


1/2 NS @ 100mls/hr post Banana Bag


Monitor BMP 





High Anion Gap Metabolic Acidosis


Likely 2/2 to EtOH


ABG





Hx of HTN/HLD


Patient forgot names of medication, he states he has a list in a suitcase he 

left at home. 


Verify Meds in AM





Proph


Heparin, Protonix, Regular Diet 





Dispo: Patient needs medication reconciliation.  Medication list needs to be 

brought in or pharmacy needs to be called.  





Patient discussed with Attending


Israel Rodas, PGY-2 











<Rajan Zavala - Last Filed: 09/17/18 06:40>





Results





- Vital Signs


Recent Vital Signs: 





 Last Vital Signs











Temp  98 F   09/17/18 04:00


 


Pulse  51 L  09/17/18 04:00


 


Resp  18   09/17/18 04:00


 


BP  138/60   09/17/18 04:00


 


Pulse Ox  95   09/17/18 04:00














- Labs


Result Diagrams: 


 09/17/18 06:07





 09/16/18 17:26


Labs: 





 Laboratory Results - last 24 hr











  09/16/18 09/16/18 09/16/18





  17:26 17:26 17:26


 


WBC  11.3 H  


 


RBC  5.44  


 


Hgb  17.0  


 


Hct  51.6 H  


 


MCV  94.9 H  


 


MCH  31.2 H  


 


MCHC  32.9 L  


 


RDW  13.4  


 


Plt Count  255  


 


MPV  8.7  


 


Neut % (Auto)  46.6 L  


 


Lymph % (Auto)  35.4  


 


Mono % (Auto)  14.3 H  


 


Eos % (Auto)  2.8  


 


Baso % (Auto)  0.9  


 


Neut # (Auto)  5.3  


 


Lymph # (Auto)  4.0  


 


Mono # (Auto)  1.6 H  


 


Eos # (Auto)  0.3  


 


Baso # (Auto)  0.1  


 


PT   11.2 


 


INR   1.0 


 


APTT   30 


 


Puncture Site   


 


pCO2   


 


pO2   


 


HCO3   


 


ABG pH   


 


ABG Total CO2   


 


ABG O2 Saturation   


 


ABG Base Excess   


 


Yann Test   


 


ABG Potassium   


 


A-a O2 Difference   


 


Respiratory Index   


 


Glucose   


 


Lactate   


 


Liter Flow   


 


FiO2   


 


Sodium    152 H


 


Potassium    4.5


 


Chloride    106


 


Carbon Dioxide    13 L


 


Anion Gap    37 H


 


BUN    9


 


Creatinine    1.1


 


Est GFR ( Amer)    > 60


 


Est GFR (Non-Af Amer)    > 60


 


Random Glucose    160 H


 


Hemoglobin A1c   


 


Calcium    10.8 H


 


Magnesium   


 


Total Bilirubin    0.6


 


AST    161 H


 


ALT    179 H


 


Alkaline Phosphatase    86


 


Troponin I    < 0.0120


 


Total Protein    9.7 H


 


Albumin    5.3 H


 


Globulin    4.3 H


 


Albumin/Globulin Ratio    1.2


 


Triglycerides    235 H


 


Cholesterol    237 H


 


LDL Cholesterol Direct    118


 


HDL Cholesterol    71 H


 


Arterial Blood Potassium   


 


Urine Color   


 


Urine Clarity   


 


Urine pH   


 


Ur Specific Gravity   


 


Urine Protein   


 


Urine Glucose (UA)   


 


Urine Ketones   


 


Urine Blood   


 


Urine Nitrate   


 


Urine Bilirubin   


 


Urine Urobilinogen   


 


Ur Leukocyte Esterase   


 


Urine WBC (Auto)   


 


Urine RBC (Auto)   


 


Amorphous Sediment   


 


Urine Bacteria   


 


Hyaline Casts   


 


Urine Opiates Screen   


 


Urine Methadone Screen   


 


Ur Barbiturates Screen   


 


Ur Phencyclidine Scrn   


 


Ur Amphetamines Screen   


 


U Benzodiazepines Scrn   


 


U Oth Cocaine Metabols   


 


U Cannabinoids Screen   


 


Alcohol, Quantitative    14 H


 


Blood Type   


 


Antibody Screen   














  09/16/18 09/16/18 09/16/18





  17:26 17:26 17:26


 


WBC   


 


RBC   


 


Hgb   


 


Hct   


 


MCV   


 


MCH   


 


MCHC   


 


RDW   


 


Plt Count   


 


MPV   


 


Neut % (Auto)   


 


Lymph % (Auto)   


 


Mono % (Auto)   


 


Eos % (Auto)   


 


Baso % (Auto)   


 


Neut # (Auto)   


 


Lymph # (Auto)   


 


Mono # (Auto)   


 


Eos # (Auto)   


 


Baso # (Auto)   


 


PT   


 


INR   


 


APTT   


 


Puncture Site   


 


pCO2   


 


pO2   


 


HCO3   


 


ABG pH   


 


ABG Total CO2   


 


ABG O2 Saturation   


 


ABG Base Excess   


 


Yann Test   


 


ABG Potassium   


 


A-a O2 Difference   


 


Respiratory Index   


 


Glucose   


 


Lactate   


 


Liter Flow   


 


FiO2   


 


Sodium   


 


Potassium   


 


Chloride   


 


Carbon Dioxide   


 


Anion Gap   


 


BUN   


 


Creatinine   


 


Est GFR ( Amer)   


 


Est GFR (Non-Af Amer)   


 


Random Glucose   


 


Hemoglobin A1c  5.8  


 


Calcium   


 


Magnesium    2.4 H


 


Total Bilirubin   


 


AST   


 


ALT   


 


Alkaline Phosphatase   


 


Troponin I   


 


Total Protein   


 


Albumin   


 


Globulin   


 


Albumin/Globulin Ratio   


 


Triglycerides   


 


Cholesterol   


 


LDL Cholesterol Direct   


 


HDL Cholesterol   


 


Arterial Blood Potassium   


 


Urine Color   


 


Urine Clarity   


 


Urine pH   


 


Ur Specific Gravity   


 


Urine Protein   


 


Urine Glucose (UA)   


 


Urine Ketones   


 


Urine Blood   


 


Urine Nitrate   


 


Urine Bilirubin   


 


Urine Urobilinogen   


 


Ur Leukocyte Esterase   


 


Urine WBC (Auto)   


 


Urine RBC (Auto)   


 


Amorphous Sediment   


 


Urine Bacteria   


 


Hyaline Casts   


 


Urine Opiates Screen   


 


Urine Methadone Screen   


 


Ur Barbiturates Screen   


 


Ur Phencyclidine Scrn   


 


Ur Amphetamines Screen   


 


U Benzodiazepines Scrn   


 


U Oth Cocaine Metabols   


 


U Cannabinoids Screen   


 


Alcohol, Quantitative   


 


Blood Type   A POSITIVE 


 


Antibody Screen   Negative 














  09/16/18 09/16/18 09/17/18





  18:14 18:14 00:31


 


WBC   


 


RBC   


 


Hgb   


 


Hct   


 


MCV   


 


MCH   


 


MCHC   


 


RDW   


 


Plt Count   


 


MPV   


 


Neut % (Auto)   


 


Lymph % (Auto)   


 


Mono % (Auto)   


 


Eos % (Auto)   


 


Baso % (Auto)   


 


Neut # (Auto)   


 


Lymph # (Auto)   


 


Mono # (Auto)   


 


Eos # (Auto)   


 


Baso # (Auto)   


 


PT   


 


INR   


 


APTT   


 


Puncture Site    Lr


 


pCO2    41


 


pO2    81


 


HCO3    25.8


 


ABG pH    7.41


 


ABG Total CO2    27.3


 


ABG O2 Saturation    97.3


 


ABG Base Excess    1.2


 


Yann Test    Pos


 


ABG Potassium    4.1


 


A-a O2 Difference    17.0


 


Respiratory Index    0.2


 


Glucose    107


 


Lactate    1.7


 


Liter Flow    0


 


FiO2    21.0


 


Sodium    138.0


 


Potassium   


 


Chloride    105.0


 


Carbon Dioxide   


 


Anion Gap   


 


BUN   


 


Creatinine   


 


Est GFR ( Amer)   


 


Est GFR (Non-Af Amer)   


 


Random Glucose   


 


Hemoglobin A1c   


 


Calcium   


 


Magnesium   


 


Total Bilirubin   


 


AST   


 


ALT   


 


Alkaline Phosphatase   


 


Troponin I   


 


Total Protein   


 


Albumin   


 


Globulin   


 


Albumin/Globulin Ratio   


 


Triglycerides   


 


Cholesterol   


 


LDL Cholesterol Direct   


 


HDL Cholesterol   


 


Arterial Blood Potassium    4.1


 


Urine Color  Yellow  


 


Urine Clarity  Clear  


 


Urine pH  5.0  


 


Ur Specific Gravity  1.017  


 


Urine Protein  2+ H  


 


Urine Glucose (UA)  Normal  


 


Urine Ketones  Negative  


 


Urine Blood  2+ H  


 


Urine Nitrate  Negative  


 


Urine Bilirubin  Negative  


 


Urine Urobilinogen  Normal  


 


Ur Leukocyte Esterase  Neg  


 


Urine WBC (Auto)  1  


 


Urine RBC (Auto)  12 H  


 


Amorphous Sediment  Moderate H  


 


Urine Bacteria  Rare  


 


Hyaline Casts  6-10 H  


 


Urine Opiates Screen   Positive H 


 


Urine Methadone Screen   Negative 


 


Ur Barbiturates Screen   Negative 


 


Ur Phencyclidine Scrn   Negative 


 


Ur Amphetamines Screen   Negative 


 


U Benzodiazepines Scrn   Negative 


 


U Oth Cocaine Metabols   Negative 


 


U Cannabinoids Screen   Negative 


 


Alcohol, Quantitative   


 


Blood Type   


 


Antibody Screen   














  09/17/18 09/17/18





  06:07 06:07


 


WBC   9.0


 


RBC   4.76


 


Hgb   14.9  D


 


Hct   43.5


 


MCV   91.4  D


 


MCH   31.4 H


 


MCHC   34.3


 


RDW   13.0


 


Plt Count   173


 


MPV   9.1


 


Neut % (Auto)   75.7 H


 


Lymph % (Auto)   13.7 L


 


Mono % (Auto)   10.1 H


 


Eos % (Auto)   0.1


 


Baso % (Auto)   0.4


 


Neut # (Auto)   6.8


 


Lymph # (Auto)   1.2


 


Mono # (Auto)   0.9 H


 


Eos # (Auto)   0.0


 


Baso # (Auto)   0.0


 


PT  


 


INR  


 


APTT  27 


 


Puncture Site  


 


pCO2  


 


pO2  


 


HCO3  


 


ABG pH  


 


ABG Total CO2  


 


ABG O2 Saturation  


 


ABG Base Excess  


 


Yann Test  


 


ABG Potassium  


 


A-a O2 Difference  


 


Respiratory Index  


 


Glucose  


 


Lactate  


 


Liter Flow  


 


FiO2  


 


Sodium  


 


Potassium  


 


Chloride  


 


Carbon Dioxide  


 


Anion Gap  


 


BUN  


 


Creatinine  


 


Est GFR ( Amer)  


 


Est GFR (Non-Af Amer)  


 


Random Glucose  


 


Hemoglobin A1c  


 


Calcium  


 


Magnesium  


 


Total Bilirubin  


 


AST  


 


ALT  


 


Alkaline Phosphatase  


 


Troponin I  


 


Total Protein  


 


Albumin  


 


Globulin  


 


Albumin/Globulin Ratio  


 


Triglycerides  


 


Cholesterol  


 


LDL Cholesterol Direct  


 


HDL Cholesterol  


 


Arterial Blood Potassium  


 


Urine Color  


 


Urine Clarity  


 


Urine pH  


 


Ur Specific Gravity  


 


Urine Protein  


 


Urine Glucose (UA)  


 


Urine Ketones  


 


Urine Blood  


 


Urine Nitrate  


 


Urine Bilirubin  


 


Urine Urobilinogen  


 


Ur Leukocyte Esterase  


 


Urine WBC (Auto)  


 


Urine RBC (Auto)  


 


Amorphous Sediment  


 


Urine Bacteria  


 


Hyaline Casts  


 


Urine Opiates Screen  


 


Urine Methadone Screen  


 


Ur Barbiturates Screen  


 


Ur Phencyclidine Scrn  


 


Ur Amphetamines Screen  


 


U Benzodiazepines Scrn  


 


U Oth Cocaine Metabols  


 


U Cannabinoids Screen  


 


Alcohol, Quantitative  


 


Blood Type  


 


Antibody Screen  














Assessment & Plan





- Date & Time


Date: 09/17/18 (I have seen and examined the patient.  I agree with the 

findings and plan of care as documented by Dr. Rodas.  Patient with acute 

seizure activity.  Keppra IV.  Alcohol abuse.  [facility] protocol.  Consult to 

neuro.  Seizure precautions.  Hypertremia.  1/2 NS IVF. Recheck in AM.  If 

hypernatremia persists, check urine electrolytes and serum/urine osmolalities.  

Monitor for acute changes.)


Time: 06:37





Attending/Attestation





- Attestation


I have personally seen and examined this patient.: Yes


I have fully participated in the care of the patient.: Yes


I have reviewed all pertinent clinical information: Yes

## 2018-09-16 NOTE — C.PDOC
History Of Present Illness


58 year old male, with PMHx of brain tumor resection in , presents to the 

ED with a seizure shortly prior to arrival. As per EMS, patients family called 

because patient felt like he was going to have a seizure about 2 hours ago. 

This is the last time well. Upon arrival, patient was reportedly combative and 

witnessed to have a tonic-clonic seizure lasting 1 minute, in which Ativan was 

administered immediately. Patient became alert afterwards with no postictal 

period and appeared to have a leftward gaze. He was, however, responsive to 

questions and commands. At this point, EMS was able to move the patients right 

extremities but was unable to move left extremities. Patient is reportedly on 

Keppra due to brain tumor history, but did not take any today. Patient is not 

diagnosed with seizure disorder. 


Time Seen by Provider: 18 17:19


Chief Complaint (Nursing): Weakness/Neurological Deficit


History Per: EMS


History/Exam Limitations: other (HPI unobtainable due to clinical condition)





Past Medical History


Reviewed: Historical Data, Nursing Documentation, Vital Signs


Vital Signs: 


 Last Vital Signs











Temp      


 


Pulse  90   18 18:19


 


Resp  18   18 18:19


 


BP  120/79   18 18:19


 


Pulse Ox  97   18 18:19














- Medical History


PMH: HTN, Hypercholesterolemia, Seizures


Family History: States: No Known Family Hx





- Social History


Hx Alcohol Use: Yes


Hx Substance Use: No





- Immunization History


Hx Tetanus Toxoid Vaccination: No





Review Of Systems


Review Of Systems: ROS cannot be obtained secondary to pt's inabilty to answer 

questions. (due clinical condition)





Physical Exam





- Physical Exam


Additional Physical Exam Comments: 





Constitutional: Sedated.


Head: Normocephalic. Atraumatic.


Eyes: Pinpoint pupils bilaterally.


ENT: Moist mucous membranes.


Neck: Supple.


Cardiovascular: Tachycardic.


Chest: No tenderness.


Respiratory: Clear to auscultation bilaterally.


GI: Healed RUQ scar.


Back: No CVA tenderness.


Musculoskeletal: No tenderness or swelling of extremities.


Skin: No rash.


Neurologic: Sedated. Not alert.








ED Course And Treatment





- Laboratory Results


Result Diagrams: 


 18 17:26





 18 17:26


ECG: Interpreted By Me, Viewed By Me


Interpretation Of ECG: Normal sinus rhythym. No ST elevation.


Rate From EC


O2 Sat by Pulse Oximetry: 97 (RA)


Pulse Ox Interpretation: Normal





- CT Scan/US


  ** CT Head


Other Rad Studies (CT/US): Read By Radiologist, Radiology Report Reviewed


CT/US Interpretation: FINDINGS:  HEMORRHAGE:  No intracranial hemorrhage.  BRAIN

:  No mass effect or edema. Mild atrophy. Chronic microvascular ischemic 

changes. Right parieto-occipital region encephalomalacia subjacent to 

craniotomy.  VENTRICLES:  Unremarkable. No hydrocephalus.  CALVARIUM:  Right 

parieto-occipital craniotomy.  PARANASAL SINUSES:  Bifrontal and left maxillary 

sinus mucosal thickening.  MASTOID AIR CELLS:  Unremarkable as visualized. No 

inflammatory changes.  OTHER FINDINGS:  None.  IMPRESSION:  No acute 

intracranial pathology.  Right parietal occipital region encephalomalacia 

subjacent to prior craniotomy. Please note, there is limited assessment in the 

absence of intravenous contrast. Contrast-enhanced MRI would better evaluate 

for residual tumor in this region.





NIHSS Stroke Scale 2





- Date/Time Evaluation Performed


Date Performed: 18


Time Performed: 17:53


When Was NIHSS Performed: Baseline





- How Severe is the Stroke


Level of Consciousness: 0=Alert


LOC to Questions: 0=Both comments correct


LOC to commands: 0=Obeys both correctly


Best Gaze: 0=Normal


Visual: 0=No visual loss


Facial: 0=Normal


Motor Arm - Left: 0=No drift


Motor Arm - Right: 0=No drift


Motor Leg - Left: 0=No drift


Motor Leg - Right: 0=No drift


Limb Ataxia: 0=Absent


Sensory: 0=Normal


Best Language: 0=No aphasia


Dysarthia: 0=Normal articulation


Extinction & Inattention (Neglect): 0=Normal, no object


Score: 0


Severity Of Stroke: 0 = No Stroke





rTPA Inclusion/Exclusion





- Refusal of Treatment


Patient Refused Treatment: No





- Inclusion Criteria for Altepase


Patient is 18 years or Older: Yes


The Clinical Diagnosis of Ischemic Stroke That is Causing a Potentially 

Disabling Neurological Deficit: No


Time of Onset is Well Established to be Less Than 270 Minute Before Treatment 

Would Begin: Yes


Risk/Benefit Discussed With Patient/Family Member Present: Yes





- Exclusion Criteria for Altepase


History of: Brain Tumor





- Warning to TPA With Conditions


Condition: Seizure at Onset of Stroke





Medical Decision Making


Medical Decision Making: 


Impression: Seizure





Plan:


-Labs


-CT Head


-CTA Head/Neck


-EKG


-Chest X-Ray


-Urinalysis





Critical care time: 60 minutes. Required by immediate attention upon arrival 

due to life threatening condition and complex decision making. 





6:00pm - Patient returned from CT scan and was more alert; however, still 

drowsy. He was able to answer questions and follow commands, and moves 

bilateral extremities spontaneously. No obvious facial droop. Pupils reactive. 

Sister at bedside states patient has had multiple seizures since brain tumor 

resection and patient typically takes Keppra every day, but ran out 2 days ago. 

She also states patient is a daily beer drinker approximately drinking 12 8oz 

cans per day. 


Chest X-Ray showed no acute disease. 





Dr. Hernandez accepts patient to medical service, will hand off to incoming 

hospitalist team. Dr. Sutherland accepts consult, recommends Keppra 1500mg loading 

dose and 500mg Q12H.





Aspirin not administered as patient is having likely seizure disorder at this 

time, not CVA.





Disposition


Discussed With .: Carson Sutherland


Doctor Will See Patient In The: Hospital





- Disposition


Disposition: HOSPITALIZED


Disposition Time: 18:52


Condition: GUARDED


Forms:  [facility] (English)





- Clinical Impression


Clinical Impression: 


 Seizure








- Scribe Statement


The provider has reviewed the documentation as recorded by the Jovon Llamas


Provider Attestation: 


All medical record entries made by the Brittibshruti were at my direction and 

personally dictated by me. I have reviewed the chart and agree that the record 

accurately reflects my personal performance of the history, physical exam, 

medical decision making, and the department course for this patient. I have 

also personally directed, reviewed, and agree with the discharge instructions 

and disposition.

## 2018-09-17 LAB
ALBUMIN SERPL-MCNC: 4.2 G/DL (ref 3.5–5)
ALBUMIN/GLOB SERPL: 1.2 {RATIO} (ref 1–2.1)
ALT SERPL-CCNC: 143 U/L (ref 21–72)
ARTERIAL BLOOD GAS O2 SAT: 97.3 % (ref 95–98)
ARTERIAL BLOOD GAS PCO2: 41 MM/HG (ref 35–45)
ARTERIAL BLOOD GAS TCO2: 27.3 MMOL/L (ref 22–28)
ARTERIAL PATENCY WRIST A: (no result)
AST SERPL-CCNC: 98 U/L (ref 17–59)
BASOPHILS # BLD AUTO: 0 K/UL (ref 0–0.2)
BASOPHILS NFR BLD: 0.4 % (ref 0–2)
BUN SERPL-MCNC: 14 MG/DL (ref 9–20)
CALCIUM SERPL-MCNC: 9.2 MG/DL (ref 8.6–10.4)
EOSINOPHIL # BLD AUTO: 0 K/UL (ref 0–0.7)
EOSINOPHIL NFR BLD: 0.1 % (ref 0–4)
ERYTHROCYTE [DISTWIDTH] IN BLOOD BY AUTOMATED COUNT: 13 % (ref 11.5–14.5)
GFR NON-AFRICAN AMERICAN: > 60
HCO3 BLDA-SCNC: 25.8 MMOL/L (ref 21–28)
HGB BLD-MCNC: 14.9 G/DL (ref 12–18)
INHALED O2 CONCENTRATION: 21 %
LYMPHOCYTES # BLD AUTO: 1.2 K/UL (ref 1–4.3)
LYMPHOCYTES NFR BLD AUTO: 13.7 % (ref 20–40)
MCH RBC QN AUTO: 31.4 PG (ref 27–31)
MCHC RBC AUTO-ENTMCNC: 34.3 G/DL (ref 33–37)
MCV RBC AUTO: 91.4 FL (ref 80–94)
MONOCYTES # BLD: 0.9 K/UL (ref 0–0.8)
MONOCYTES NFR BLD: 10.1 % (ref 0–10)
NEUTROPHILS # BLD: 6.8 K/UL (ref 1.8–7)
NEUTROPHILS NFR BLD AUTO: 75.7 % (ref 50–75)
NRBC BLD AUTO-RTO: 0 % (ref 0–2)
PH BLDA: 7.41 [PH] (ref 7.35–7.45)
PLATELET # BLD: 173 K/UL (ref 130–400)
PMV BLD AUTO: 9.1 FL (ref 7.2–11.7)
PO2 BLDA: 81 MM/HG (ref 80–100)
RBC # BLD AUTO: 4.76 MIL/UL (ref 4.4–5.9)
WBC # BLD AUTO: 9 K/UL (ref 4.8–10.8)

## 2018-09-17 RX ADMIN — PANTOPRAZOLE SODIUM SCH MG: 40 TABLET, DELAYED RELEASE ORAL at 09:33

## 2018-09-17 NOTE — CARD
--------------- APPROVED REPORT --------------





Date of service: 09/16/2018



EKG Measurement

Heart [id number]

NM [id number]

[id number]

[id number]

[id number]



<Conclusion>

Normal sinus rhythm

Possible Left atrial enlargement

Incomplete right bundle branch block

Left ventricular hypertrophy

Nonspecific ST and T wave abnormality

Abnormal ECG

## 2018-09-17 NOTE — CP.PCM.PN
Subjective





- Date & Time of Evaluation


Date of Evaluation: 09/17/18


Time of Evaluation: 07:30





- Subjective


Subjective: 





PGY-1 Medicine Progress note for Dr. Norton





Patient was seen and examined today at bedside in no acute distress. Nurse 

reports no overnight events. Patient reports great improvement in mental status 

than when he first arrived. Denies chest pain, shortness of breath, abdominal 

pain, loss of consciousness. Patient has been voiding and having BM without 

difficulty. Tolerating diet.





Objective





- Vital Signs/Intake and Output


Vital Signs (last 24 hours): 


 











Temp Pulse Resp BP Pulse Ox


 


 98.2 F   57 L  20   121/46 L  97 


 


 09/17/18 16:00  09/17/18 16:00  09/17/18 16:00  09/17/18 15:10  09/17/18 16:00








Intake and Output: 


 











 09/17/18 09/17/18





 06:59 18:59


 


Intake Total 900 1670


 


Balance 900 1670














- Medications


Medications: 


 Current Medications





Amlodipine Besylate (Norvasc)  10 mg PO DAILY [facility]


   Last Admin: 09/17/18 09:33 Dose:  10 mg


Heparin Sodium (Porcine) (Heparin)  5,000 units SC Q8 [facility]


   Last Admin: 09/17/18 13:36 Dose:  5,000 units


Levetiracetam 500 mg/ Dextrose  105 mls @ 420 mls/hr IVPB Q12H [facility]


   Last Admin: 09/17/18 06:10 Dose:  420 mls/hr


Sodium Chloride (Sodium Chloride 0.45%)  1,000 mls @ 100 mls/hr IV .Q10H [facility]


   Last Admin: 09/17/18 15:42 Dose:  100 mls/hr


Ibuprofen (Motrin Tab)  600 mg PO TID PRN


   PRN Reason: Pain, Mild - Severe (1-10)


   Last Admin: 09/17/18 01:40 Dose:  600 mg


Lamotrigine (Lamictal)  200 mg PO BID [facility]


Lisinopril (Zestril)  10 mg PO DAILY [facility]


   Last Admin: 09/17/18 11:03 Dose:  10 mg


Lorazepam (Ativan)  2 mg IVP Q2H PRN


   PRN Reason: Symptoms of alcohol withdrawl


Ondansetron HCl (Zofran Inj)  4 mg IVP Q6H PRN


   PRN Reason: Nausea/Vomiting


   Last Admin: 09/16/18 20:53 Dose:  4 mg


Pantoprazole Sodium (Protonix Ec Tab)  40 mg PO DAILY [facility]


   Last Admin: 09/17/18 09:33 Dose:  40 mg


Rosuvastatin Calcium (Crestor)  5 mg PO HS [facility]











- Labs


Labs: 


 





 09/17/18 06:07 





 09/17/18 06:07 





 











PT  11.2 SECONDS (9.7-12.2)   09/16/18  17:26    


 


INR  1.0   09/16/18  17:26    


 


APTT  27 SECONDS (21-34)   09/17/18  06:07    














- Constitutional


Appears: Non-toxic, No Acute Distress





- Head Exam


Head Exam: ATRAUMATIC, NORMOCEPHALIC





- Eye Exam


Eye Exam: EOMI, Normal appearance





- ENT Exam


ENT Exam: Mucous Membranes Moist, Normal Exam





- Respiratory Exam


Respiratory Exam: Clear to Ausculation Bilateral, NORMAL BREATHING PATTERN.  

absent: Rales, Rhonchi, Wheezes





- Cardiovascular Exam


Cardiovascular Exam: REGULAR RHYTHM, +S1, +S2.  absent: Murmur





- GI/Abdominal Exam


GI & Abdominal Exam: Soft, Normal Bowel Sounds.  absent: Firm, Guarding, 

Tenderness, Rebound





- Extremities Exam


Extremities Exam: Full ROM, Normal Capillary Refill.  absent: Pedal Edema, 

Tenderness





- Neurological Exam


Neurological Exam: Alert, Awake, Normal Gait, Oriented x3





- Psychiatric Exam


Psychiatric exam: Normal Affect, Normal Mood





- Skin


Skin Exam: Dry, Intact, Normal Color





Assessment and Plan





- Assessment and Plan (Free Text)


Assessment: 


Mr. Moreno is a 58 year old male with a past medical history of brain tumor (s/p 

brain tumor resection in 2015) and colon cancer (s/p resection) who presents to 

the ED with complaints of a seizure episode prior to coming to the hospital.  

Patient admitted for evaluation and treatment of seizure.


Plan: 





Seizure


2/2 of Hx of Brain Tumor Resection vs Alcohol Withdrawal.


CT Head (Adm): No acute intracranial pathology. Right parietal occipital region 

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 Please note, there is limited 

assessment in the absence of intravenous contrast. Contrast-enhanced MRI would 

better evaluate for residual tumor in this region.  


CXR (Adm): no acute pathology


CTA Head/Neck (Adm): No Acute pathology


EKG


ED Course: Ativan during seizure, Keppra 1500mg 





Keppra 500mg Q12H IV


home Lamictal  daily-> 200mg bid


Neurology Consult (Dr. Sutherland) - signed off


Seizure Precautions. 


Neurochecks, HOB elevated to 45 Degrees, 


Swallow Eval passed


CIWA Protocol, aspiration precautions





EtoH Abuse/Withdrawal


Alcohol Lvl 14, UDS Positive for Opiates. 


CIWA protocol


Zofran prn


Banana Bag finished


Ativan 2mg Q4H [facility]/ 2mg Q2H PRN 





Hypernatremia


1/2 NS @ 100mls/hr finished


Monitor BMP 





High Anion Gap Metabolic Acidosis


Likely 2/2 to EtOH


ABG negative





Hx of HTN/HLD


- started on home med:


lisinopril 10mg po daily


imdur 30 po daily -> amlodipine 10mg po daily


simvastatin 20 -> Crestor 5mg po HS





Proph


Heparin 5000u sc q8


Protonix 40mg po daily


Regular Diet 





Dispo: possible d/c tomorrow if no signs of withdrawal





d/w Dr. Errol Santiago PGY-1

## 2018-09-17 NOTE — CT
PROCEDURE:  CTA HEAD AND NECK WITH CONTRAST



HISTORY:

Left sided weakness



COMPARISON:

None available. 



TECHNIQUE:

Initial noncontrast head CT was performed. Subsequently, CT angiogram 

of the head and neck were performed after the intravenous 

administration of 80 mL of Omnipaque 350.  Contiguous 1.5mm thick 

images were obtained in the axial plane of the neck. 2-D coronal and 

sagittal MPR images were obtained. Imaging postprocessing was 

performed with 3-D images also obtained. A delayed contrast head CT 

was also obtained. This CT exam was performed using one or more of 

the following dose reduction techniques: Automated exposure control, 

adjustment of the mA and/or kV according to patient size, and/or use 

of iterative reconstruction technique.



Contrast dose: 100 mL Visipaque



Radiation dose:



Total exam DLP = [id number] mGy-cm.



FINDINGS:



HEAD:

Right:



 The intracranial internal carotid artery, and anterior and middle 

cerebral arteries are widely patent.



Left:



The intracranial internal carotid artery, and anterior and middle 

cerebral arteries are widely patent. 



Posterior circulation: 



The visualized intracranial vertebral arteries, basilar artery and 

posterior cerebral arteries are widely patent.



There is no endoluminal filling defect to suggest thrombus. There is 

no intracranial saccular aneurysm.



There is no abnormal enhancement on the postcontrast CT. 



NECK:

There is a three vessel aortic arch. There is no stenosis at the 

origins of the great vessels at the level of the aortic arch.



Right Carotid:



On the right, the common carotid, internal carotid and external 

carotid arteries are widely patent.



There is no hemodynamically significant stenosis in the internal 

carotid artery by NASCET criteria. 



A preliminary report was provided by [employer] services.



Left Carotid:



On the left, the common carotid, internal carotid and external 

carotid arteries are widely patent.There is no hemodynamically 

significant stenosis in the internal carotid arteries.



There is no hemodynamically significant stenosis in the internal 

carotid artery by NASCET criteria.



The vertebral arteries are widely patent. 



The visualized soft tissues of the neck are normal. The visualized 

brain and cervical spine are within normal limits.



The lung apices are clear. 



IMPRESSION:

1. No evidence of endoluminal thrombus,occlusion or definite 

significant stenosis in the intracranial arteries.



2. No evidence of hemodynamically significant stenosis in the 

internal carotid arteries.



3. Patent bilateral vertebral arteries.

## 2018-09-17 NOTE — RAD
Chest x-ray single frontal view 



History: Code stroke. 



Comparison: None available. 



Findings: 



Moderate venous congestion. 



Patchy increased markings at the left lung base with small left 

pleural effusion. 



Right hilar prominence. 



Cardiomegaly. 



Surgical clips in the right upper abdomen. 



Degenerative changes in the spine and shoulders. 



Impression:



Moderate venous congestion. 



Patchy increased markings at the left lung base with small left 

pleural effusion. 



Right hilar prominence. 



Cardiomegaly. 



Surgical clips in the right upper abdomen.

## 2018-09-17 NOTE — CP.PCM.CON
<ChuckYuri BRENDEN - Last Filed: 09/17/18 11:46>





History of Present Illness





- History of Present Illness


History of Present Illness: 


PGY-2 neurology progress note for Dr Sutherland





Mr Moreno is a 58 year old male with a PMHx of previously resected right parietal 

lobe mass (benign), seizure disorder (on Keppra and Lamictal), colon CA (

remission s/p resection and chemotherapy), hepatectomy for liver mets, HTN, HLD 

who was brought by EMS for seizure activity on sunday afternoon (yesterday). 

Patient stated he ran out of his anti-epileptic seizure medication and last 

took his AEDs friday morning (his seizure occurred sunday afternoon). In 

addition, he's been drinking heavily in the past few days with his last drink 

on saturday night. He drinks 6 tall boys of beer daily. He follow-up with 

Neurologist Dr Ambriz however hasn't seen in "in a long while". Sunday afternoon he began to have dizziness and called family members telling them he 

feels as if he's about to have a seizure. EMS arrived and witnessed a 1 minute 

tonic-clonic seizure, ativan was given which resolved the seizure activity. He 

was given keppra 1500mg loading dose in the ED and is now on keppra 500mg po 

q12h and has been seizure free since admission. 





PMD: Dr Elder Lucero


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

on Keppra and Lamictal), colon CA (remission s/p resection and chemotherapy)


PSHx: Brain Tumor Resection (benign), Colectomy, hepatectomy for liver mets


Allergies: seafood - rash


Home Meds (confirmed with [facility] in [place]): keppra 1500mg po 

bid, lamictal 100mg in am and 300mg in pm, lisinopril 10mg po qd, imdur 30mg po 

qd, simvastatin 20mg po hs, omeprazole 40mg po qd


FamHx: denied


SocialHx: drinks about 6-7 24oz beers daily, denies Illicit drug use, denies 

tobacco use





Review of Systems





- Constitutional


Constitutional: absent: Chills, Fever





- EENT


Eyes: absent: Blurred Vision





- Cardiovascular


Cardiovascular: absent: Chest Pain





- Respiratory


Respiratory: absent: Cough





- Gastrointestinal


Gastrointestinal: absent: Abdominal Pain, Diarrhea





- Genitourinary


Genitourinary: absent: Dysuria





- Musculoskeletal


Musculoskeletal: absent: Abnormal Gait





- Neurological


Neurological: absent: Dizziness, Numbness, Focal Weakness, Headaches





Past Patient History





- Past Medical History & Family History


Past Medical History?: Yes





- Past Social History


Smoking Status: Former Smoker





- CARDIAC


Hx Hypercholesterolemia: Yes


Hx Hypertension: Yes





- PULMONARY


Hx Respiratory Disorders: No





- NEUROLOGICAL


Hx Seizures: Yes





- HEENT


Hx HEENT Problems: No





- RENAL


Hx Chronic Kidney Disease: No





- ENDOCRINE/METABOLIC


Hx Endocrine Disorders: No





- HEMATOLOGICAL/ONCOLOGICAL


Hx Blood Disorders: Yes


Hx Cancer: Yes (brain tumor)





- INTEGUMENTARY


Hx Dermatological Problems: No





- MUSCULOSKELETAL/RHEUMATOLOGICAL


Hx Falls: Yes





- GASTROINTESTINAL


Hx Gastrointestinal Disorders: Yes


Hx Bowel Surgery: Yes (COLON)





- GENITOURINARY/GYNECOLOGICAL


Hx Genitourinary Disorders: No





- PSYCHIATRIC


Hx Substance Use: Yes





- SURGICAL HISTORY


Hx Surgeries: Yes


Other/Comment: COLON SX, BRAIN TUMOR EXCISION





- ANESTHESIA


Hx Anesthesia: Yes


Hx Anesthesia Reactions: No


Hx Malignant Hyperthermia: No


Has any member of the family had a problem w/ anesthesia?: No





Meds


Allergies/Adverse Reactions: 


 Allergies











Allergy/AdvReac Type Severity Reaction Status Date / Time


 


seafood Allergy  SHORTNESS Uncoded 08/20/18 16:59





   OF BREATH  














- Medications


Medications: 


 Current Medications





Amlodipine Besylate (Norvasc)  10 mg PO DAILY [facility]


   Last Admin: 09/17/18 09:33 Dose:  10 mg


Heparin Sodium (Porcine) (Heparin)  5,000 units SC Q8 [facility]


   Last Admin: 09/17/18 09:33 Dose:  5,000 units


Levetiracetam 500 mg/ Dextrose  105 mls @ 420 mls/hr IVPB Q12H [facility]


   Last Admin: 09/17/18 06:10 Dose:  420 mls/hr


Sodium Chloride (Sodium Chloride 0.45%)  1,000 mls @ 100 mls/hr IV .Q10H [facility]


   Last Admin: 09/17/18 05:23 Dose:  100 mls/hr


Ibuprofen (Motrin Tab)  600 mg PO TID PRN


   PRN Reason: Pain, Mild - Severe (1-10)


   Last Admin: 09/17/18 01:40 Dose:  600 mg


Lamotrigine (Lamictal)  200 mg PO BID [facility]


Lisinopril (Zestril)  10 mg PO DAILY [facility]


   Last Admin: 09/17/18 11:03 Dose:  10 mg


Lorazepam (Ativan)  2 mg IVP Q4H [facility]


   Last Admin: 09/17/18 09:34 Dose:  Not Given


Lorazepam (Ativan)  2 mg IVP Q2H PRN


   PRN Reason: Symptoms of alcohol withdrawl


Ondansetron HCl (Zofran Inj)  4 mg IVP Q6H PRN


   PRN Reason: Nausea/Vomiting


   Last Admin: 09/16/18 20:53 Dose:  4 mg


Pantoprazole Sodium (Protonix Ec Tab)  40 mg PO DAILY [facility]


   Last Admin: 09/17/18 09:33 Dose:  40 mg


Rosuvastatin Calcium (Crestor)  5 mg PO [facility]











Physical Exam





- Constitutional


Appears: Well, No Acute Distress





- Head Exam


Head Exam: ATRAUMATIC, NORMAL INSPECTION





- Eye Exam


Eye Exam: EOMI, PERRL.  absent: Nystagmus





- ENT Exam


ENT Exam: Mucous Membranes Moist





- Neck Exam


Neck exam: Positive for: Normal Inspection





- Respiratory Exam


Respiratory Exam: Clear to Auscultation Bilateral, NORMAL BREATHING PATTERN.  

absent: Rales, Rhonchi, Wheezes





- Cardiovascular Exam


Cardiovascular Exam: REGULAR RHYTHM, +S1, +S2.  absent: Bradycardia, Tachycardia

, JVD, Systolic Murmur





- GI/Abdominal Exam


GI & Abdominal Exam: Normal Bowel Sounds, Soft.  absent: Tenderness





- Extremities Exam


Extremities exam: Positive for: normal capillary refill, normal inspection, 

pedal pulses present.  Negative for: calf tenderness





- Neurological Exam


Neurological exam: Alert, CN II-XII Intact, Normal Gait, Oriented x3, Reflexes 

Normal





- Psychiatric Exam


Psychiatric exam: Normal Affect, Normal Mood





- Skin


Skin Exam: Normal Color, Warm





Results





- Vital Signs


Recent Vital Signs: 


 Last Vital Signs











Temp  97.8 F   09/17/18 08:00


 


Pulse  65   09/17/18 10:00


 


Resp  13   09/17/18 09:09


 


BP  157/72 H  09/17/18 09:09


 


Pulse Ox  92 L  09/17/18 09:09














- Labs


Result Diagrams: 


 09/17/18 06:07





 09/17/18 06:07


Labs: 


 Laboratory Results - last 24 hr











  09/16/18 09/16/18 09/16/18





  17:26 17:26 17:26


 


WBC  11.3 H  


 


RBC  5.44  


 


Hgb  17.0  


 


Hct  51.6 H  


 


MCV  94.9 H  


 


MCH  31.2 H  


 


MCHC  32.9 L  


 


RDW  13.4  


 


Plt Count  255  


 


MPV  8.7  


 


Neut % (Auto)  46.6 L  


 


Lymph % (Auto)  35.4  


 


Mono % (Auto)  14.3 H  


 


Eos % (Auto)  2.8  


 


Baso % (Auto)  0.9  


 


Neut # (Auto)  5.3  


 


Lymph # (Auto)  4.0  


 


Mono # (Auto)  1.6 H  


 


Eos # (Auto)  0.3  


 


Baso # (Auto)  0.1  


 


PT   11.2 


 


INR   1.0 


 


APTT   30 


 


Puncture Site   


 


pCO2   


 


pO2   


 


HCO3   


 


ABG pH   


 


ABG Total CO2   


 


ABG O2 Saturation   


 


ABG Base Excess   


 


Yann Test   


 


ABG Potassium   


 


A-a O2 Difference   


 


Respiratory Index   


 


Glucose   


 


Lactate   


 


Liter Flow   


 


FiO2   


 


Sodium    152 H


 


Potassium    4.5


 


Chloride    106


 


Carbon Dioxide    13 L


 


Anion Gap    37 H


 


BUN    9


 


Creatinine    1.1


 


Est GFR ( Amer)    > 60


 


Est GFR (Non-Af Amer)    > 60


 


POC Glucose (mg/dL)   


 


Random Glucose    160 H


 


Hemoglobin A1c   


 


Calcium    10.8 H


 


Magnesium   


 


Total Bilirubin    0.6


 


AST    161 H


 


ALT    179 H


 


Alkaline Phosphatase    86


 


Troponin I    < 0.0120


 


Total Protein    9.7 H


 


Albumin    5.3 H


 


Globulin    4.3 H


 


Albumin/Globulin Ratio    1.2


 


Triglycerides    235 H


 


Cholesterol    237 H


 


LDL Cholesterol Direct    118


 


HDL Cholesterol    71 H


 


Arterial Blood Potassium   


 


Urine Color   


 


Urine Clarity   


 


Urine pH   


 


Ur Specific Gravity   


 


Urine Protein   


 


Urine Glucose (UA)   


 


Urine Ketones   


 


Urine Blood   


 


Urine Nitrate   


 


Urine Bilirubin   


 


Urine Urobilinogen   


 


Ur Leukocyte Esterase   


 


Urine WBC (Auto)   


 


Urine RBC (Auto)   


 


Amorphous Sediment   


 


Urine Bacteria   


 


Hyaline Casts   


 


Urine Opiates Screen   


 


Urine Methadone Screen   


 


Ur Barbiturates Screen   


 


Ur Phencyclidine Scrn   


 


Ur Amphetamines Screen   


 


U Benzodiazepines Scrn   


 


U Oth Cocaine Metabols   


 


U Cannabinoids Screen   


 


Alcohol, Quantitative    14 H


 


Blood Type   


 


Antibody Screen   














  09/16/18 09/16/18 09/16/18





  17:26 17:26 17:26


 


WBC   


 


RBC   


 


Hgb   


 


Hct   


 


MCV   


 


MCH   


 


MCHC   


 


RDW   


 


Plt Count   


 


MPV   


 


Neut % (Auto)   


 


Lymph % (Auto)   


 


Mono % (Auto)   


 


Eos % (Auto)   


 


Baso % (Auto)   


 


Neut # (Auto)   


 


Lymph # (Auto)   


 


Mono # (Auto)   


 


Eos # (Auto)   


 


Baso # (Auto)   


 


PT   


 


INR   


 


APTT   


 


Puncture Site   


 


pCO2   


 


pO2   


 


HCO3   


 


ABG pH   


 


ABG Total CO2   


 


ABG O2 Saturation   


 


ABG Base Excess   


 


Yann Test   


 


ABG Potassium   


 


A-a O2 Difference   


 


Respiratory Index   


 


Glucose   


 


Lactate   


 


Liter Flow   


 


FiO2   


 


Sodium   


 


Potassium   


 


Chloride   


 


Carbon Dioxide   


 


Anion Gap   


 


BUN   


 


Creatinine   


 


Est GFR ( Amer)   


 


Est GFR (Non-Af Amer)   


 


POC Glucose (mg/dL)   


 


Random Glucose   


 


Hemoglobin A1c  5.8  


 


Calcium   


 


Magnesium    2.4 H


 


Total Bilirubin   


 


AST   


 


ALT   


 


Alkaline Phosphatase   


 


Troponin I   


 


Total Protein   


 


Albumin   


 


Globulin   


 


Albumin/Globulin Ratio   


 


Triglycerides   


 


Cholesterol   


 


LDL Cholesterol Direct   


 


HDL Cholesterol   


 


Arterial Blood Potassium   


 


Urine Color   


 


Urine Clarity   


 


Urine pH   


 


Ur Specific Gravity   


 


Urine Protein   


 


Urine Glucose (UA)   


 


Urine Ketones   


 


Urine Blood   


 


Urine Nitrate   


 


Urine Bilirubin   


 


Urine Urobilinogen   


 


Ur Leukocyte Esterase   


 


Urine WBC (Auto)   


 


Urine RBC (Auto)   


 


Amorphous Sediment   


 


Urine Bacteria   


 


Hyaline Casts   


 


Urine Opiates Screen   


 


Urine Methadone Screen   


 


Ur Barbiturates Screen   


 


Ur Phencyclidine Scrn   


 


Ur Amphetamines Screen   


 


U Benzodiazepines Scrn   


 


U Oth Cocaine Metabols   


 


U Cannabinoids Screen   


 


Alcohol, Quantitative   


 


Blood Type   A POSITIVE 


 


Antibody Screen   Negative 














  09/16/18 09/16/18 09/17/18





  18:14 18:14 00:31


 


WBC   


 


RBC   


 


Hgb   


 


Hct   


 


MCV   


 


MCH   


 


MCHC   


 


RDW   


 


Plt Count   


 


MPV   


 


Neut % (Auto)   


 


Lymph % (Auto)   


 


Mono % (Auto)   


 


Eos % (Auto)   


 


Baso % (Auto)   


 


Neut # (Auto)   


 


Lymph # (Auto)   


 


Mono # (Auto)   


 


Eos # (Auto)   


 


Baso # (Auto)   


 


PT   


 


INR   


 


APTT   


 


Puncture Site    Lr


 


pCO2    41


 


pO2    81


 


HCO3    25.8


 


ABG pH    7.41


 


ABG Total CO2    27.3


 


ABG O2 Saturation    97.3


 


ABG Base Excess    1.2


 


Yann Test    Pos


 


ABG Potassium    4.1


 


A-a O2 Difference    17.0


 


Respiratory Index    0.2


 


Glucose    107


 


Lactate    1.7


 


Liter Flow    0


 


FiO2    21.0


 


Sodium    138.0


 


Potassium   


 


Chloride    105.0


 


Carbon Dioxide   


 


Anion Gap   


 


BUN   


 


Creatinine   


 


Est GFR ( Amer)   


 


Est GFR (Non-Af Amer)   


 


POC Glucose (mg/dL)   


 


Random Glucose   


 


Hemoglobin A1c   


 


Calcium   


 


Magnesium   


 


Total Bilirubin   


 


AST   


 


ALT   


 


Alkaline Phosphatase   


 


Troponin I   


 


Total Protein   


 


Albumin   


 


Globulin   


 


Albumin/Globulin Ratio   


 


Triglycerides   


 


Cholesterol   


 


LDL Cholesterol Direct   


 


HDL Cholesterol   


 


Arterial Blood Potassium    4.1


 


Urine Color  Yellow  


 


Urine Clarity  Clear  


 


Urine pH  5.0  


 


Ur Specific Gravity  1.017  


 


Urine Protein  2+ H  


 


Urine Glucose (UA)  Normal  


 


Urine Ketones  Negative  


 


Urine Blood  2+ H  


 


Urine Nitrate  Negative  


 


Urine Bilirubin  Negative  


 


Urine Urobilinogen  Normal  


 


Ur Leukocyte Esterase  Neg  


 


Urine WBC (Auto)  1  


 


Urine RBC (Auto)  12 H  


 


Amorphous Sediment  Moderate H  


 


Urine Bacteria  Rare  


 


Hyaline Casts  6-10 H  


 


Urine Opiates Screen   Positive H 


 


Urine Methadone Screen   Negative 


 


Ur Barbiturates Screen   Negative 


 


Ur Phencyclidine Scrn   Negative 


 


Ur Amphetamines Screen   Negative 


 


U Benzodiazepines Scrn   Negative 


 


U Oth Cocaine Metabols   Negative 


 


U Cannabinoids Screen   Negative 


 


Alcohol, Quantitative   


 


Blood Type   


 


Antibody Screen   














  09/17/18 09/17/18 09/17/18





  06:07 06:07 06:07


 


WBC   9.0 


 


RBC   4.76 


 


Hgb   14.9  D 


 


Hct   43.5 


 


MCV   91.4  D 


 


MCH   31.4 H 


 


MCHC   34.3 


 


RDW   13.0 


 


Plt Count   173 


 


MPV   9.1 


 


Neut % (Auto)   75.7 H 


 


Lymph % (Auto)   13.7 L 


 


Mono % (Auto)   10.1 H 


 


Eos % (Auto)   0.1 


 


Baso % (Auto)   0.4 


 


Neut # (Auto)   6.8 


 


Lymph # (Auto)   1.2 


 


Mono # (Auto)   0.9 H 


 


Eos # (Auto)   0.0 


 


Baso # (Auto)   0.0 


 


PT   


 


INR   


 


APTT  27  


 


Puncture Site   


 


pCO2   


 


pO2   


 


HCO3   


 


ABG pH   


 


ABG Total CO2   


 


ABG O2 Saturation   


 


ABG Base Excess   


 


Yann Test   


 


ABG Potassium   


 


A-a O2 Difference   


 


Respiratory Index   


 


Glucose   


 


Lactate   


 


Liter Flow   


 


FiO2   


 


Sodium    139


 


Potassium    4.0


 


Chloride    103


 


Carbon Dioxide    25


 


Anion Gap    15


 


BUN    14


 


Creatinine    0.7 L


 


Est GFR ( Amer)    > 60


 


Est GFR (Non-Af Amer)    > 60


 


POC Glucose (mg/dL)   


 


Random Glucose    105


 


Hemoglobin A1c   


 


Calcium    9.2


 


Magnesium    2.5 H


 


Total Bilirubin    0.5


 


AST    98 H D


 


ALT    143 H D


 


Alkaline Phosphatase    74


 


Troponin I   


 


Total Protein    7.7


 


Albumin    4.2


 


Globulin    3.5


 


Albumin/Globulin Ratio    1.2


 


Triglycerides   


 


Cholesterol   


 


LDL Cholesterol Direct   


 


HDL Cholesterol   


 


Arterial Blood Potassium   


 


Urine Color   


 


Urine Clarity   


 


Urine pH   


 


Ur Specific Gravity   


 


Urine Protein   


 


Urine Glucose (UA)   


 


Urine Ketones   


 


Urine Blood   


 


Urine Nitrate   


 


Urine Bilirubin   


 


Urine Urobilinogen   


 


Ur Leukocyte Esterase   


 


Urine WBC (Auto)   


 


Urine RBC (Auto)   


 


Amorphous Sediment   


 


Urine Bacteria   


 


Hyaline Casts   


 


Urine Opiates Screen   


 


Urine Methadone Screen   


 


Ur Barbiturates Screen   


 


Ur Phencyclidine Scrn   


 


Ur Amphetamines Screen   


 


U Benzodiazepines Scrn   


 


U Oth Cocaine Metabols   


 


U Cannabinoids Screen   


 


Alcohol, Quantitative   


 


Blood Type   


 


Antibody Screen   














  09/17/18





  07:13


 


WBC 


 


RBC 


 


Hgb 


 


Hct 


 


MCV 


 


MCH 


 


MCHC 


 


RDW 


 


Plt Count 


 


MPV 


 


Neut % (Auto) 


 


Lymph % (Auto) 


 


Mono % (Auto) 


 


Eos % (Auto) 


 


Baso % (Auto) 


 


Neut # (Auto) 


 


Lymph # (Auto) 


 


Mono # (Auto) 


 


Eos # (Auto) 


 


Baso # (Auto) 


 


PT 


 


INR 


 


APTT 


 


Puncture Site 


 


pCO2 


 


pO2 


 


HCO3 


 


ABG pH 


 


ABG Total CO2 


 


ABG O2 Saturation 


 


ABG Base Excess 


 


Yann Test 


 


ABG Potassium 


 


A-a O2 Difference 


 


Respiratory Index 


 


Glucose 


 


Lactate 


 


Liter Flow 


 


FiO2 


 


Sodium 


 


Potassium 


 


Chloride 


 


Carbon Dioxide 


 


Anion Gap 


 


BUN 


 


Creatinine 


 


Est GFR ( Amer) 


 


Est GFR (Non-Af Amer) 


 


POC Glucose (mg/dL)  103


 


Random Glucose 


 


Hemoglobin A1c 


 


Calcium 


 


Magnesium 


 


Total Bilirubin 


 


AST 


 


ALT 


 


Alkaline Phosphatase 


 


Troponin I 


 


Total Protein 


 


Albumin 


 


Globulin 


 


Albumin/Globulin Ratio 


 


Triglycerides 


 


Cholesterol 


 


LDL Cholesterol Direct 


 


HDL Cholesterol 


 


Arterial Blood Potassium 


 


Urine Color 


 


Urine Clarity 


 


Urine pH 


 


Ur Specific Gravity 


 


Urine Protein 


 


Urine Glucose (UA) 


 


Urine Ketones 


 


Urine Blood 


 


Urine Nitrate 


 


Urine Bilirubin 


 


Urine Urobilinogen 


 


Ur Leukocyte Esterase 


 


Urine WBC (Auto) 


 


Urine RBC (Auto) 


 


Amorphous Sediment 


 


Urine Bacteria 


 


Hyaline Casts 


 


Urine Opiates Screen 


 


Urine Methadone Screen 


 


Ur Barbiturates Screen 


 


Ur Phencyclidine Scrn 


 


Ur Amphetamines Screen 


 


U Benzodiazepines Scrn 


 


U Oth Cocaine Metabols 


 


U Cannabinoids Screen 


 


Alcohol, Quantitative 


 


Blood Type 


 


Antibody Screen 














Assessment & Plan





- Assessment and Plan (Free Text)


Plan: 


Mr Moreno is a 58 year old male with a PMHx of previously resected right parietal 

lobe mass (benign), seizure disorder (on Keppra and Lamictal), colon CA (

remission s/p resection and chemotherapy), hepatectomy for liver mets, HTN, HLD 

who was brought by EMS for seizure activity:





Seizure Activity


-2/2 to not taking his home AEDs, patient ran out of meds 2 days prior to 

seizure, also has not followed up with his neurologist Dr Ambriz "in a long 

while"


* At home he takes keppra 1500mg po bid, lamictal 100mg in am and 300mg in pm (

confirmed w/ pharmacy)


-2/2 to heavy alcohol use, last drink 18 hrs to seizure


-Has been seizure-free since admission


-There was initially concern in ED for stroke-like symptoms however on our exam 

no focal deficits noted, NIHSS was 0 (and also 0 in ED), and imaging does not 

show any new pathology (still pending official read of CTA head/neck)


-Continue keppra 500mg ivpb q12h


-Continue lamictal 20mmg po bid


-Recommend to taper ativan


-he passed swallow eval, continue ciwa, continue seizure precautions





Imaging:


-Head CT w/o contrast:


* No acute intracranial pathology. Right parietal occipital region 

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 Please note, there is limited 

assessment in the absence of intravenous contrast. Contrast-enhanced MRI would 

better evaluate for residual tumor in this region.


-CTA head/neck: pending official read





-Pending CTA head/neck, if no acute pathology seen neurology will sign-off. It 

was emphasized to patient he needs to follow-up with Dr Ambriz, to reduce his 

alcohol intake and to get his medications re-filled before he runs out of them.





Case discussed with neurologist Dr Sutherland.








<Carson Sutherland - Last Filed: 09/17/18 20:10>





Meds





- Medications


Medications: 


 Current Medications





Amlodipine Besylate (Norvasc)  10 mg PO DAILY [facility]


   Last Admin: 09/17/18 09:33 Dose:  10 mg


Heparin Sodium (Porcine) (Heparin)  5,000 units SC Q8 [facility]


   Last Admin: 09/17/18 13:36 Dose:  5,000 units


Levetiracetam 500 mg/ Dextrose  105 mls @ 420 mls/hr IVPB Q12H [facility]


   Last Admin: 09/17/18 18:10 Dose:  420 mls/hr


Sodium Chloride (Sodium Chloride 0.45%)  1,000 mls @ 100 mls/hr IV .Q10H [facility]


   Last Admin: 09/17/18 15:42 Dose:  100 mls/hr


Ibuprofen (Motrin Tab)  600 mg PO TID PRN


   PRN Reason: Pain, Mild - Severe (1-10)


   Last Admin: 09/17/18 01:40 Dose:  600 mg


Lamotrigine (Lamictal)  200 mg PO BID [facility]


   Last Admin: 09/17/18 18:15 Dose:  200 mg


Lisinopril (Zestril)  10 mg PO DAILY [facility]


   Last Admin: 09/17/18 11:03 Dose:  10 mg


Lorazepam (Ativan)  2 mg IVP Q2H PRN


   PRN Reason: Symptoms of alcohol withdrawl


Ondansetron HCl (Zofran Inj)  4 mg IVP Q6H [facility]


   PRN Reason: Nausea/Vomiting


   Last Admin: 09/16/18 20:53 Dose:  4 mg


Pantoprazole Sodium (Protonix Ec Tab)  40 mg PO DAILY [facility]


   Last Admin: 09/17/18 09:33 Dose:  40 mg


Rosuvastatin Calcium (Crestor)  5 mg PO HS [facility]











Results





- Vital Signs


Recent Vital Signs: 


 Last Vital Signs











Temp  98.2 F   09/17/18 16:00


 


Pulse  67   09/17/18 19:10


 


Resp  15   09/17/18 19:10


 


BP  111/82   09/17/18 19:10


 


Pulse Ox  96   09/17/18 19:10














- Labs


Result Diagrams: 


 09/17/18 06:07





 09/17/18 06:07


Labs: 


 Laboratory Results - last 24 hr











  09/16/18 09/16/18 09/17/18





  17:26 17:26 00:31


 


WBC   


 


RBC   


 


Hgb   


 


Hct   


 


MCV   


 


MCH   


 


MCHC   


 


RDW   


 


Plt Count   


 


MPV   


 


Neut % (Auto)   


 


Lymph % (Auto)   


 


Mono % (Auto)   


 


Eos % (Auto)   


 


Baso % (Auto)   


 


Neut # (Auto)   


 


Lymph # (Auto)   


 


Mono # (Auto)   


 


Eos # (Auto)   


 


Baso # (Auto)   


 


APTT   


 


Puncture Site    Lr


 


pCO2    41


 


pO2    81


 


HCO3    25.8


 


ABG pH    7.41


 


ABG Total CO2    27.3


 


ABG O2 Saturation    97.3


 


ABG Base Excess    1.2


 


Yann Test    Pos


 


ABG Potassium    4.1


 


A-a O2 Difference    17.0


 


Respiratory Index    0.2


 


Sodium    138.0


 


Chloride    105.0


 


Glucose    107


 


Lactate    1.7


 


Liter Flow    0


 


FiO2    21.0


 


Potassium   


 


Carbon Dioxide   


 


Anion Gap   


 


BUN   


 


Creatinine   


 


Est GFR ( Amer)   


 


Est GFR (Non-Af Amer)   


 


POC Glucose (mg/dL)   


 


Random Glucose   


 


Hemoglobin A1c  5.8  


 


Calcium   


 


Magnesium   2.4 H 


 


Total Bilirubin   


 


AST   


 


ALT   


 


Alkaline Phosphatase   


 


Total Protein   


 


Albumin   


 


Globulin   


 


Albumin/Globulin Ratio   


 


Arterial Blood Potassium    4.1














  09/17/18 09/17/18 09/17/18





  06:07 06:07 06:07


 


WBC   9.0 


 


RBC   4.76 


 


Hgb   14.9  D 


 


Hct   43.5 


 


MCV   91.4  D 


 


MCH   31.4 H 


 


MCHC   34.3 


 


RDW   13.0 


 


Plt Count   173 


 


MPV   9.1 


 


Neut % (Auto)   75.7 H 


 


Lymph % (Auto)   13.7 L 


 


Mono % (Auto)   10.1 H 


 


Eos % (Auto)   0.1 


 


Baso % (Auto)   0.4 


 


Neut # (Auto)   6.8 


 


Lymph # (Auto)   1.2 


 


Mono # (Auto)   0.9 H 


 


Eos # (Auto)   0.0 


 


Baso # (Auto)   0.0 


 


APTT  27  


 


Puncture Site   


 


pCO2   


 


pO2   


 


HCO3   


 


ABG pH   


 


ABG Total CO2   


 


ABG O2 Saturation   


 


ABG Base Excess   


 


Yann Test   


 


ABG Potassium   


 


A-a O2 Difference   


 


Respiratory Index   


 


Sodium    139


 


Chloride    103


 


Glucose   


 


Lactate   


 


Liter Flow   


 


FiO2   


 


Potassium    4.0


 


Carbon Dioxide    25


 


Anion Gap    15


 


BUN    14


 


Creatinine    0.7 L


 


Est GFR ( Amer)    > 60


 


Est GFR (Non-Af Amer)    > 60


 


POC Glucose (mg/dL)   


 


Random Glucose    105


 


Hemoglobin A1c   


 


Calcium    9.2


 


Magnesium    2.5 H


 


Total Bilirubin    0.5


 


AST    98 H D


 


ALT    143 H D


 


Alkaline Phosphatase    74


 


Total Protein    7.7


 


Albumin    4.2


 


Globulin    3.5


 


Albumin/Globulin Ratio    1.2


 


Arterial Blood Potassium   














  09/17/18 09/17/18 09/17/18





  07:13 11:50 16:38


 


WBC   


 


RBC   


 


Hgb   


 


Hct   


 


MCV   


 


MCH   


 


MCHC   


 


RDW   


 


Plt Count   


 


MPV   


 


Neut % (Auto)   


 


Lymph % (Auto)   


 


Mono % (Auto)   


 


Eos % (Auto)   


 


Baso % (Auto)   


 


Neut # (Auto)   


 


Lymph # (Auto)   


 


Mono # (Auto)   


 


Eos # (Auto)   


 


Baso # (Auto)   


 


APTT   


 


Puncture Site   


 


pCO2   


 


pO2   


 


HCO3   


 


ABG pH   


 


ABG Total CO2   


 


ABG O2 Saturation   


 


ABG Base Excess   


 


Yann Test   


 


ABG Potassium   


 


A-a O2 Difference   


 


Respiratory Index   


 


Sodium   


 


Chloride   


 


Glucose   


 


Lactate   


 


Liter Flow   


 


FiO2   


 


Potassium   


 


Carbon Dioxide   


 


Anion Gap   


 


BUN   


 


Creatinine   


 


Est GFR ( Amer)   


 


Est GFR (Non-Af Amer)   


 


POC Glucose (mg/dL)  103  126 H  101


 


Random Glucose   


 


Hemoglobin A1c   


 


Calcium   


 


Magnesium   


 


Total Bilirubin   


 


AST   


 


ALT   


 


Alkaline Phosphatase   


 


Total Protein   


 


Albumin   


 


Globulin   


 


Albumin/Globulin Ratio   


 


Arterial Blood Potassium   














Attending/Attestation





- Attestation


I have personally seen and examined this patient.: Yes


I have fully participated in the care of the patient.: Yes


I have reviewed all pertinent clinical information: Yes


Notes (Text): 





09/17/18 20:06


Agree with assessment and plan.  On exam, the patient was neurologically intact 

and non-focal.

## 2018-09-18 VITALS — HEART RATE: 50 BPM | RESPIRATION RATE: 20 BRPM

## 2018-09-18 VITALS — TEMPERATURE: 98.1 F

## 2018-09-18 VITALS — DIASTOLIC BLOOD PRESSURE: 89 MMHG | SYSTOLIC BLOOD PRESSURE: 167 MMHG

## 2018-09-18 VITALS — OXYGEN SATURATION: 96 %

## 2018-09-18 LAB
ALBUMIN SERPL-MCNC: 4.1 G/DL (ref 3.5–5)
ALBUMIN/GLOB SERPL: 1.2 {RATIO} (ref 1–2.1)
ALT SERPL-CCNC: 118 U/L (ref 21–72)
AST SERPL-CCNC: 101 U/L (ref 17–59)
BASOPHILS # BLD AUTO: 0 K/UL (ref 0–0.2)
BASOPHILS NFR BLD: 0.6 % (ref 0–2)
BUN SERPL-MCNC: 13 MG/DL (ref 9–20)
CALCIUM SERPL-MCNC: 9.5 MG/DL (ref 8.6–10.4)
EOSINOPHIL # BLD AUTO: 0.1 K/UL (ref 0–0.7)
EOSINOPHIL NFR BLD: 1.9 % (ref 0–4)
ERYTHROCYTE [DISTWIDTH] IN BLOOD BY AUTOMATED COUNT: 12.8 % (ref 11.5–14.5)
GFR NON-AFRICAN AMERICAN: > 60
HGB BLD-MCNC: 14.6 G/DL (ref 12–18)
LYMPHOCYTES # BLD AUTO: 1.7 K/UL (ref 1–4.3)
LYMPHOCYTES NFR BLD AUTO: 26 % (ref 20–40)
MCH RBC QN AUTO: 30.8 PG (ref 27–31)
MCHC RBC AUTO-ENTMCNC: 33.8 G/DL (ref 33–37)
MCV RBC AUTO: 91.2 FL (ref 80–94)
MONOCYTES # BLD: 0.7 K/UL (ref 0–0.8)
MONOCYTES NFR BLD: 10.6 % (ref 0–10)
NEUTROPHILS # BLD: 4 K/UL (ref 1.8–7)
NEUTROPHILS NFR BLD AUTO: 60.9 % (ref 50–75)
NRBC BLD AUTO-RTO: 0 % (ref 0–2)
PLATELET # BLD: 151 K/UL (ref 130–400)
PMV BLD AUTO: 9.1 FL (ref 7.2–11.7)
RBC # BLD AUTO: 4.75 MIL/UL (ref 4.4–5.9)
WBC # BLD AUTO: 6.5 K/UL (ref 4.8–10.8)

## 2018-09-18 RX ADMIN — PANTOPRAZOLE SODIUM SCH MG: 40 TABLET, DELAYED RELEASE ORAL at 09:55

## 2018-09-18 NOTE — CP.PCM.DIS
Provider





- Provider


Date of Admission: 


09/16/18 18:49





Attending physician: 


Dami Norton MD





Time Spent in preparation of Discharge (in minutes): 45





Hospital Course





- Lab Results


Lab Results: 


 Micro Results





09/16/18 Unknown   Urine,Clean Catch   Urine Culture - Final


                                No Growth (<1,000 CFU/ML)





 Most Recent Lab Values











WBC  6.5 K/uL (4.8-10.8)   09/18/18  06:06    


 


RBC  4.75 Mil/uL (4.40-5.90)   09/18/18  06:06    


 


Hgb  14.6 g/dL (12.0-18.0)   09/18/18  06:06    


 


Hct  43.3 % (35.0-51.0)   09/18/18  06:06    


 


MCV  91.2 fL (80.0-94.0)   09/18/18  06:06    


 


MCH  30.8 pg (27.0-31.0)   09/18/18  06:06    


 


MCHC  33.8 g/dL (33.0-37.0)   09/18/18  06:06    


 


RDW  12.8 % (11.5-14.5)   09/18/18  06:06    


 


Plt Count  151 K/uL (130-400)   09/18/18  06:06    


 


MPV  9.1 fL (7.2-11.7)   09/18/18  06:06    


 


Neut % (Auto)  60.9 % (50.0-75.0)   09/18/18  06:06    


 


Lymph % (Auto)  26.0 % (20.0-40.0)   09/18/18  06:06    


 


Mono % (Auto)  10.6 % (0.0-10.0)  H  09/18/18  06:06    


 


Eos % (Auto)  1.9 % (0.0-4.0)   09/18/18  06:06    


 


Baso % (Auto)  0.6 % (0.0-2.0)   09/18/18  06:06    


 


Neut # (Auto)  4.0 K/uL (1.8-7.0)   09/18/18  06:06    


 


Lymph # (Auto)  1.7 K/uL (1.0-4.3)   09/18/18  06:06    


 


Mono # (Auto)  0.7 K/uL (0.0-0.8)   09/18/18  06:06    


 


Eos # (Auto)  0.1 K/uL (0.0-0.7)   09/18/18  06:06    


 


Baso # (Auto)  0.0 K/uL (0.0-0.2)   09/18/18  06:06    


 


PT  11.2 SECONDS (9.7-12.2)   09/16/18  17:26    


 


INR  1.0   09/16/18  17:26    


 


APTT  31 SECONDS (21-34)   09/18/18  06:06    


 


Puncture Site  Lr   09/17/18  00:31    


 


pCO2  41 mm/Hg (35-45)   09/17/18  00:31    


 


pO2  81 mm/Hg ()   09/17/18  00:31    


 


HCO3  25.8 mmol/L (21-28)   09/17/18  00:31    


 


ABG pH  7.41  (7.35-7.45)   09/17/18  00:31    


 


ABG Total CO2  27.3 mmol/L (22-28)   09/17/18  00:31    


 


ABG O2 Saturation  97.3 % (95-98)   09/17/18  00:31    


 


ABG Base Excess  1.2 mmol/L (-2.0-3.0)   09/17/18  00:31    


 


Yann Test  Pos   09/17/18  00:31    


 


ABG Potassium  4.1 mmol/L (3.6-5.2)   09/17/18  00:31    


 


A-a O2 Difference  17.0 mm/Hg  09/17/18  00:31    


 


Respiratory Index  0.2   09/17/18  00:31    


 


Sodium  138.0 mmol/l (132-148)   09/17/18  00:31    


 


Chloride  105.0 mmol/L ()   09/17/18  00:31    


 


Glucose  107 mg/dl ()   09/17/18  00:31    


 


Lactate  1.7 mmol/L (0.7-2.1)   09/17/18  00:31    


 


Liter Flow  0   09/17/18  00:31    


 


FiO2  21.0 %  09/17/18  00:31    


 


Sodium  141 mmol/L (132-148)   09/18/18  06:06    


 


Potassium  4.3 mmol/L (3.6-5.2)   09/18/18  06:06    


 


Chloride  105 mmol/L ()   09/18/18  06:06    


 


Carbon Dioxide  26 mmol/L (22-30)   09/18/18  06:06    


 


Anion Gap  14  (10-20)   09/18/18  06:06    


 


BUN  13 mg/dL (9-20)   09/18/18  06:06    


 


Creatinine  0.8 mg/dL (0.8-1.5)   09/18/18  06:06    


 


Est GFR ( Amer)  > 60   09/18/18  06:06    


 


Est GFR (Non-Af Amer)  > 60   09/18/18  06:06    


 


POC Glucose (mg/dL)  89 mg/dL ()   09/18/18  07:22    


 


Random Glucose  108 mg/dL ()   09/18/18  06:06    


 


Hemoglobin A1c  5.8 % (4.2-6.5)   09/16/18  17:26    


 


Calcium  9.5 mg/dl (8.6-10.4)   09/18/18  06:06    


 


Magnesium  2.1 mg/dL (1.6-2.3)   09/18/18  06:06    


 


Total Bilirubin  0.5 mg/dL (0.2-1.3)   09/18/18  06:06    


 


AST  101 U/L (17-59)  H  09/18/18  06:06    


 


ALT  118 U/L (21-72)  H  09/18/18  06:06    


 


Alkaline Phosphatase  67 U/L ()   09/18/18  06:06    


 


Troponin I  < 0.0120 ng/mL (0.00-0.120)   09/16/18  17:26    


 


Total Protein  7.5 g/dL (6.3-8.3)   09/18/18  06:06    


 


Albumin  4.1 g/dL (3.5-5.0)   09/18/18  06:06    


 


Globulin  3.4 gm/dL (2.2-3.9)   09/18/18  06:06    


 


Albumin/Globulin Ratio  1.2  (1.0-2.1)   09/18/18  06:06    


 


Triglycerides  235 mg/dL (0-149)  H  09/16/18  17:26    


 


Cholesterol  237 mg/dL (0-199)  H  09/16/18  17:26    


 


LDL Cholesterol Direct  118 mg/dL (0-129)   09/16/18  17:26    


 


HDL Cholesterol  71 mg/dL (30-70)  H  09/16/18  17:26    


 


Arterial Blood Potassium  4.1 mmol/L (3.6-5.2)   09/17/18  00:31    


 


Urine Color  Yellow  (YELLOW)   09/16/18  18:14    


 


Urine Clarity  Clear  (Clear)   09/16/18  18:14    


 


Urine pH  5.0  (5.0-8.0)   09/16/18  18:14    


 


Ur Specific Gravity  1.017  (1.003-1.030)   09/16/18  18:14    


 


Urine Protein  2+ mg/dL (NEGATIVE)  H  09/16/18  18:14    


 


Urine Glucose (UA)  Normal mg/dL (Normal)   09/16/18  18:14    


 


Urine Ketones  Negative mg/dL (NEGATIVE)   09/16/18  18:14    


 


Urine Blood  2+  (NEGATIVE)  H  09/16/18  18:14    


 


Urine Nitrate  Negative  (NEGATIVE)   09/16/18  18:14    


 


Urine Bilirubin  Negative  (NEGATIVE)   09/16/18  18:14    


 


Urine Urobilinogen  Normal mg/dL (0.2-1.0)   09/16/18  18:14    


 


Ur Leukocyte Esterase  Neg Rosey/uL (Negative)   09/16/18  18:14    


 


Urine WBC (Auto)  1 /hpf (0-5)   09/16/18  18:14    


 


Urine RBC (Auto)  12 /hpf (0-3)  H  09/16/18  18:14    


 


Amorphous Sediment  Moderate /ul (<OCC)  H  09/16/18  18:14    


 


Urine Bacteria  Rare  (<OCC)   09/16/18  18:14    


 


Hyaline Casts  6-10 /lpf (0-2)  H  09/16/18  18:14    


 


Urine Opiates Screen  Positive  (NEGATIVE)  H  09/16/18  18:14    


 


Urine Methadone Screen  Negative  (NEGATIVE)   09/16/18  18:14    


 


Ur Barbiturates Screen  Negative  (NEGATIVE)   09/16/18  18:14    


 


Ur Phencyclidine Scrn  Negative  (NEGATIVE)   09/16/18  18:14    


 


Ur Amphetamines Screen  Negative  (NEGATIVE)   09/16/18  18:14    


 


U Benzodiazepines Scrn  Negative  (NEGATIVE)   09/16/18  18:14    


 


U Oth Cocaine Metabols  Negative  (NEGATIVE)   09/16/18  18:14    


 


U Cannabinoids Screen  Negative  (NEGATIVE)   09/16/18  18:14    


 


Alcohol, Quantitative  14 mg/dl (0-10)  H  09/16/18  17:26    


 


Blood Type  A POSITIVE   09/16/18  17:26    


 


Antibody Screen  Negative   09/16/18  17:26    














- Hospital Course


Hospital Course: 


Mr. Moreno is a 58 year old male with a past medical history of brain tumor (s/p 

brain tumor resection in 2015) who presents to the ED with complaints of a 

seizure episode prior to coming to the hospital.  Patient states he was aware 

that the seizure was about the occur because he began to feel dizzy.  Patient 

had 1 episode of a tonic-clonic seizure in the ED lasting  about 1 minute and 

treated with Ativan per E.D note.  A loading dose of Keppra was given in the 

E.D due to history of brain tumor s/p resection.  Patient denies any fever, 

chills, urinary/bowel incontinence, tongue biting, vision changes, chest pain, 

shortness of breath, abdominal pain, vomiting, changes in bowel habits, or 

urinary symptoms.  Patient does admit to headache, diffuse body aches, and 

nausea.  Of note, patient's history and review of systems may be unreliable due 

to patient's lethargy and lack of alertness. More detailed history and review 

of systems unobtainable due to patient's lethargy. 





Head CT and CTA Head/Neck negative for any acute pathology. CXR showed a small 

left pleural effusion. EKG NSR, incomplete RBBB, L ventricular hypertrophy. 

However, all this is stable and comparable to previous EKG. Neuro cleared him 

since imaging was negative and patient clinically improved after being placed 

on home medication confirmed by calling his pharmacy. Patient is not showing 

any signs of alcohol withdrawal and is medically stable for discharge.





Primary Diagnosis: Seizure 2/2 medication noncompliance





1). Seizure secondary to noncompliance with medications: continue the Keppra 

and Lamictal and follow up with his outpatient neurologist Dr. Barksdale


2). ETOH Abuse: counseled extensively on 9/17/18 exam. NOT interested in AA 

meetings as he has gone in the past. Stated that he is alive despite all that 

has happened to him. NO signes of withdrawl on exam today (last drink was 

Saturday 9/15/18)


3). Hypernatremia: resolved


4). Anion Gap Metabolic Acidosis: resolved with IVF


5). HTN: continue the Imdur and Lisinopril


6). HLD: continue the Simvastatin


7). GERD: continue the Omeprazole


8). Elevated LFTs: secondary to the alcohol abuse. Trending down.





Patient has been cleared by Neurology


He is medically stable for outpatient follow up with his PMD DR. SIMONE Lucero





The following instructions were explained to the patient and a copy of these 

instructions will need to be provided to him upon discharge:





1). Schedule follow up with your Neurologist Dr. Sal to take place this week.





2). Schedule follow up with your Primary Care Provider Dr. SIMONE Lucero to take 

place in the next 7 to 10 days.





3). Please have the following medications filled at your pharmacy on your way 

home from the hospital:





Lamictal Xr 400 mg, 1 tablet by 1 time a day (2 PM), Dispense #30, NO refills


Keppra 500 mg, 1 tablet by mouth 2 times a day (8 AM and 8 PM), Dispense #60, 

NO refills


Imdur ER 30 mg, 1 tablet by mouth 1 time a day (8 AM), Dispense #30, NO refills


Lisinopril 10 mg, 1 tablet by mouth 1 time a day (8 PM), Dispense #30, NO 

refills


Simvastatin 20 mg, 1 tablet by mouth 1 time a day (8 PM), Dispense #30, NO 

refills


Omeprazole 40 mg, 1 tablet by mouth 1 time a day (8 AM), Dispense #30 NO refills





4). You must stop drinking. You stated that AA meetings are not your thing. You 

will increase your chances of being alcohol free by going to AA meetings. 

Please go. You can perform a simple Google search to find the nearest AA 

meeting to your residence.





5). Please take care and be well.





Patient is clear for discharge per Dr. Norton. Patient is not being started on 

any new medications and can restart his home medications. He has been given new 

prescriptions for his home medications which can add to his existing refills at 

[facility]. It is very important that you stop drinking and take your 

anti-seizure meds as prescribed. You are to take the Keppra twice a day and the 

Lamictal once a day. You are to call and book an appointment with your PMD Dr. Elder Lucero in the next week. Please follow up with your neurologist, Dr. Sal, in the next 7-10 days. Plan was discussed and agreed upon with patient.





This is a summary of the hospital course. For more information, please refer to 

the EMR.





- Date & Time of H&P


Date of H&P: 09/18/18


Time of H&P: 07:50





Discharge Exam





- Head Exam


Head Exam: ATRAUMATIC, NORMOCEPHALIC





- Eye Exam


Eye Exam: EOMI, Normal appearance





- ENT Exam


ENT Exam: Mucous Membranes Moist





- Respiratory Exam


Respiratory Exam: Clear to PA & Lateral, NORMAL BREATHING PATTERN.  absent: 

Rales, Rhonchi, Wheezes, Respiratory Distress





- Cardiovascular Exam


Cardiovascular Exam: REGULAR RHYTHM, +S1, +S2.  absent: Gallop, Rubs, Systolic 

Murmur





- GI/Abdominal Exam


GI & Abdominal Exam: Normal Bowel Sounds, Organomegaly, Soft.  absent: Guarding

, Tenderness





- Extremities Exam


Extremities exam: normal capillary refill, pedal pulses present


Additional comments: 





L arm IV to be removed prior to discharge





- Neurological Exam


Neurological exam: Alert, CN II-XII Intact, Normal Gait, Oriented x3, Reflexes 

Normal





- Psychiatric Exam


Psychiatric exam: Normal Affect, Normal Mood





Discharge Plan





- Discharge Medications


Prescriptions: 


Isosorbide Mononitrate ER [Imdur ER] 30 mg PO DAILY #30 tab


Lamotrigine [Lamictal Xr] 400 mg PO DAILY #30 tab.er.24


levETIRAcetam [Keppra] 1,500 mg PO BID #60 tab


Lisinopril [Zestril] 10 mg PO DAILY #30 tablet


Omeprazole 40 mg PO DAILY #30 capsule.


Simvastatin [Zocor] 20 mg PO HS #30 tablet





- Follow Up Plan


Condition: GUARDED


Disposition: HOME/ ROUTINE


Additional Instructions: 


Patient is clear for discharge per Dr. Norton. Patient is not being started on 

any new medications and can restart his home medications. He has been given new 

prescriptions for his home medications which can add to his existing refills at 

[facility]. It is very important that you stop drinking and take your 

anti-seizure meds as prescribed. You are to take the Keppra twice a day and the 

Lamictal once a day. You are to call and book an appointment with your PMD Dr. Elder Lucero in the next week. Please follow up with your neurologist, Dr. Sal, in the next 7-10 days. Plan was discussed and agreed upon with patient.


Referrals: 


Elder Lucero MD [Staff Provider] -

## 2018-09-18 NOTE — CP.PCM.PN
Subjective





- Date & Time of Evaluation


Date of Evaluation: 09/18/18


Time of Evaluation: 09:15





- Subjective


Subjective: 





Hospitalist Progress Note





Patient was seen and examined at 9:15 AM 9/18/18 ICU Bed 3





Upon FULL ROS:


NO dysphagia/odynopahgia


NO soreness in throat


NO cough/SOB/Wheezing


NO sinus/nasal congestion


NO fever/chills


NO muscle aches/pains


NO joint pain


NO chest pain/palpation


NO abdominal pain


NO n/v/d/c: last normal bowel movement was 9/17/18


NO burning pain with urination


NO HA


NO lightheadedness/dizziness


NO paresthesias


NO new changes in vision


NO new changes in hearing





Exam:


General: AAOX3, NAD


HEENT: NCA, EOMI, PERRLA, NO cervical/supraclavicular/submandibular 

lymphadenopathy, NO pharyngeal erythema/exudate, Nasal Turbinates are 

nonerythematous/nonedematous, Oral Mucosa is moist


Cardio: NS1 and NS2, NO M/R/G


Resp: CTA B/L, NO R/R/W


GI: BSx4, Soft, NT, NO HSM, NO guarding/rebound tenderness


Ext: Pulses are strong and equal, Capillary Refill is 2 seconds, NO edema


Neuro: CN II through XII are grossly intact





Assessments:





1). Seizure secondary to noncompliance with medications: continue the Keppra 

and Lamictal and follow up with his outpatient neurologist Dr. Barksdale


2). ETOH Abuse: counseled extensively on 9/17/18 exam. NOT interested in AA 

meetings as he has gone in the past. Stated that he is alive despite all that 

has happened to him. NO signes of withdrawl on exam today (last drink was 

Saturday 9/15/18)


3). Hypernatremia: resolved


4). Anion Gap Metabolic Acidosis: resolved with IVF


5). HTN: continue the Imdur and Lisinopril


6). HLD: continue the Simvastatin


7). GERD: continue the Omeprazole


8). Elevated LFTs: secondary to the alcohol abuse. Trending down.





Patient has been cleared by Neurology


He is medically stable for outpatient follow up with his PMD DR. SIMONE Lucero





The following instructions were explained to the patient and a copy of these 

instructions will need to be provided to him upon discharge:





1). Schedule follow up with your Neurologist Dr. Sal to take place this week.





2). Schedule follow up with your Primary Care Provider Dr. SIMONE Lucero to take 

place in the next 7 to 10 days.





3). Please have the following medications filled at your pharmacy on your way 

home from the hospital:





Lamictal Xr 400 mg, 1 tablet by 1 time a day (2 PM), Dispense #30, NO refills


Keppra 500 mg, 1 tablet by mouth 2 times a day (8 AM and 8 PM), Dispense #60, 

NO refills


Imdur ER 30 mg, 1 tablet by mouth 1 time a day (8 AM), Dispense #30, NO refills


Lisinopril 10 mg, 1 tablet by mouth 1 time a day (8 PM), Dispense #30, NO 

refills


Simvastatin 20 mg, 1 tablet by mouth 1 time a day (8 PM), Dispense #30, NO 

refills


Omeprazole 40 mg, 1 tablet by mouth 1 time a day (8 AM), Dispense #30 NO refills





4). You must stop drinking. You stated that AA meetings are not your thing. You 

will increase your chances of being alcohol free by going to AA meetings. 

Please go. You can perform a simple Google search to find the nearest AA 

meeting to your residence.





5). Please take care and be well.





Dami Norton D.O.








Objective





- Vital Signs/Intake and Output


Vital Signs (last 24 hours): 


 











Temp Pulse Resp BP Pulse Ox


 


 98 F   50 L  20   170/71 H  96 


 


 09/18/18 04:00  09/18/18 04:00  09/18/18 04:00  09/18/18 04:00  09/18/18 04:00








Intake and Output: 


 











 09/18/18 09/18/18





 06:59 18:59


 


Intake Total 1900 


 


Balance 1900 














- Medications


Medications: 


 Current Medications





Amlodipine Besylate (Norvasc)  10 mg PO DAILY [facility]


   Last Admin: 09/17/18 09:33 Dose:  10 mg


Heparin Sodium (Porcine) (Heparin)  5,000 units SC Q8 [facility]


   Last Admin: 09/18/18 05:52 Dose:  5,000 units


Levetiracetam 500 mg/ Dextrose  105 mls @ 420 mls/hr IVPB Q12H [facility]


   Last Admin: 09/18/18 06:01 Dose:  420 mls/hr


Sodium Chloride (Sodium Chloride 0.45%)  1,000 mls @ 100 mls/hr IV .Q10H [facility]


   Last Admin: 09/18/18 02:17 Dose:  100 mls/hr


Ibuprofen (Motrin Tab)  600 mg PO TID PRN


   PRN Reason: Pain, Mild - Severe (1-10)


   Last Admin: 09/18/18 02:14 Dose:  600 mg


Lamotrigine (Lamictal)  200 mg PO BID [facility]


   Last Admin: 09/17/18 18:15 Dose:  200 mg


Lisinopril (Zestril)  10 mg PO DAILY [facility]


   Last Admin: 09/17/18 11:03 Dose:  10 mg


Lorazepam (Ativan)  2 mg IVP Q2H PRN


   PRN Reason: Symptoms of alcohol withdrawl


Ondansetron HCl (Zofran Inj)  4 mg IVP Q6H PRN


   PRN Reason: Nausea/Vomiting


   Last Admin: 09/16/18 20:53 Dose:  4 mg


Pantoprazole Sodium (Protonix Ec Tab)  40 mg PO DAILY [facility]


   Last Admin: 09/17/18 09:33 Dose:  40 mg


Rosuvastatin Calcium (Crestor)  5 mg PO HS [facility]


   Last Admin: 09/17/18 21:05 Dose:  5 mg











- Labs


Labs: 


 





 09/18/18 06:06 





 09/18/18 06:06 





 











PT  11.2 SECONDS (9.7-12.2)   09/16/18  17:26    


 


INR  1.0   09/16/18  17:26    


 


APTT  31 SECONDS (21-34)   09/18/18  06:06

## 2018-12-19 ENCOUNTER — HOSPITAL ENCOUNTER (INPATIENT)
Dept: HOSPITAL 31 - C.ER | Age: 58
LOS: 5 days | Discharge: HOME | DRG: 885 | End: 2018-12-24
Attending: PSYCHIATRY & NEUROLOGY | Admitting: PSYCHIATRY & NEUROLOGY
Payer: COMMERCIAL

## 2018-12-19 VITALS — BODY MASS INDEX: 31.5 KG/M2

## 2018-12-19 DIAGNOSIS — F10.239: ICD-10-CM

## 2018-12-19 DIAGNOSIS — D49.6: ICD-10-CM

## 2018-12-19 DIAGNOSIS — R56.9: ICD-10-CM

## 2018-12-19 DIAGNOSIS — E78.5: ICD-10-CM

## 2018-12-19 DIAGNOSIS — E78.00: ICD-10-CM

## 2018-12-19 DIAGNOSIS — F33.3: Primary | ICD-10-CM

## 2018-12-19 DIAGNOSIS — I10: ICD-10-CM

## 2018-12-19 DIAGNOSIS — Z59.0: ICD-10-CM

## 2018-12-19 LAB
ALBUMIN SERPL-MCNC: 4.6 G/DL (ref 3.5–5)
ALBUMIN/GLOB SERPL: 1.2 {RATIO} (ref 1–2.1)
ALT SERPL-CCNC: 133 U/L (ref 21–72)
AST SERPL-CCNC: 234 U/L (ref 17–59)
BASOPHILS # BLD AUTO: 0.1 K/UL (ref 0–0.2)
BASOPHILS NFR BLD: 0.7 % (ref 0–2)
BILIRUB UR-MCNC: NEGATIVE MG/DL
BUN SERPL-MCNC: 15 MG/DL (ref 9–20)
CALCIUM SERPL-MCNC: 10.3 MG/DL (ref 8.6–10.4)
EOSINOPHIL # BLD AUTO: 0 K/UL (ref 0–0.7)
EOSINOPHIL NFR BLD: 0.1 % (ref 0–4)
ERYTHROCYTE [DISTWIDTH] IN BLOOD BY AUTOMATED COUNT: 13.8 % (ref 11.5–14.5)
GFR NON-AFRICAN AMERICAN: > 60
GLUCOSE UR STRIP-MCNC: NORMAL MG/DL
HGB BLD-MCNC: 15.4 G/DL (ref 12–18)
HYALINE CASTS #/AREA URNS LPF: (no result) /LPF (ref 0–2)
LEUKOCYTE ESTERASE UR-ACNC: (no result) LEU/UL
LYMPHOCYTES # BLD AUTO: 0.9 K/UL (ref 1–4.3)
LYMPHOCYTES NFR BLD AUTO: 10.3 % (ref 20–40)
MCH RBC QN AUTO: 31.3 PG (ref 27–31)
MCHC RBC AUTO-ENTMCNC: 34.1 G/DL (ref 33–37)
MCV RBC AUTO: 91.6 FL (ref 80–94)
MONOCYTES # BLD: 0.7 K/UL (ref 0–0.8)
MONOCYTES NFR BLD: 8 % (ref 0–10)
NEUTROPHILS # BLD: 6.8 K/UL (ref 1.8–7)
NEUTROPHILS NFR BLD AUTO: 80.9 % (ref 50–75)
NRBC BLD AUTO-RTO: 0 % (ref 0–2)
PH UR STRIP: 5 [PH] (ref 5–8)
PLATELET # BLD: 176 K/UL (ref 130–400)
PMV BLD AUTO: 8.7 FL (ref 7.2–11.7)
PROT UR STRIP-MCNC: (no result) MG/DL
RBC # BLD AUTO: 4.92 MIL/UL (ref 4.4–5.9)
RBC # UR STRIP: (no result) /UL
SP GR UR STRIP: 1.01 (ref 1–1.03)
UROBILINOGEN UR-MCNC: NORMAL MG/DL (ref 0.2–1)
WBC # BLD AUTO: 8.3 K/UL (ref 4.8–10.8)

## 2018-12-19 SDOH — ECONOMIC STABILITY - HOUSING INSECURITY: HOMELESSNESS: Z59.0

## 2018-12-19 NOTE — C.PDOC
History Of Present Illness


58 year old male presents to the ER stating he feels nervous. Patient has a Hx 

of brain tumor and seizures. Denies other complaints at this time.


Chief Complaint (Nursing): Psychiatric Evaluation


History Per: Patient


History/Exam Limitations: no limitations


Onset/Duration Of Symptoms: Hrs


Current Symptoms Are (Timing): Still Present


Suicide/Self Injury Attempted (Context): None


Associated Symptoms: Other (Nervous)


Recent travel outside of the [place]: No





Past Medical History


Reviewed: Historical Data, Nursing Documentation, Vital Signs


Vital Signs: 





                                Last Vital Signs











Temp  98.2 F   18 18:02


 


Pulse  103 H  18 18:02


 


Resp  18   18 18:02


 


BP  170/103 H  18 18:02


 


Pulse Ox  99   18 18:02














- Medical History


PMH: HTN, Hypercholesterolemia, Hyperlipidemia, Seizures (last one 3 to 4 months

ago)


   Denies: Anxiety, Chronic Kidney Disease


Family History: States: Unknown Family Hx





- Social History


Hx Tobacco Use: No


Hx Alcohol Use: Yes (0ccasional)


Hx Substance Use: No





- Immunization History


Hx Tetanus Toxoid Vaccination: No


Hx Influenza Vaccination: No


Hx Pneumococcal Vaccination: No





Review Of Systems


Constitutional: Negative for: Fever, Chills


Cardiovascular: Negative for: Chest Pain, Palpitations


Respiratory: Negative for: Cough, Shortness of Breath


Gastrointestinal: Negative for: Nausea, Vomiting


Neurological: Negative for: Weakness, Numbness


Psych: Positive for: Other (Nervous)





Physical Exam





- Physical Exam


Appears: Non-toxic, Other (Crying in tears)


Skin: Normal Color, Warm, Dry


Head: Atraumatic, Normacephalic


Eye(s): bilateral: Normal Inspection


Oral Mucosa: Moist


Neck: Normal, Supple


Chest: Symmetrical, No Tenderness


Cardiovascular: Rhythm Regular


Respiratory: Normal Breath Sounds, No Rales, No Rhonchi, No Wheezing


Gastrointestinal/Abdominal: Soft, No Tenderness


Back: No CVA Tenderness


Neurological/Psych: Oriented x3, Normal Speech





ED Course And Treatment





- Laboratory Results


Result Diagrams: 


                                 18 19:36





                                 18 20:29


ECG: Interpreted By Me, Viewed By Me


ECG Rhythm: Sinus Rhythm


ECG Interpretation: No Acute Changes, Abnormal


Interpretation Of ECG: NSR,LVH by voltage criteria


Rate From EC


O2 Sat by Pulse Oximetry: 99 (Room air)


Pulse Ox Interpretation: Normal


Progress Note: CT head, EKG, and blood work ordered. Ativan and IV fluids admi

nistered.  Patient seen by crisis for depression, refused to be admitted, to be 

discharged with outpatient follow up.





Disposition


Discussed With : Mary Duncan


Counseled Patient/Family Regarding: Diagnosis





- Disposition


Referrals: 


[facility] [Outside]


Disposition: HOME/ ROUTINE


Disposition Time: 04:17


Condition: STABLE


Forms:  [employer] (English)





- Clinical Impression


Clinical Impression: 


 Depression, Alcohol abuse








- Scribe Statement


The provider has reviewed the documentation as recorded by the Scribe





Aguila Garcia





All medical record entries made by the Scribe were at my direction and 

personally dictated by me. I have reviewed the chart and agree that the record 

accurately reflects my personal performance of the history, physical exam, 

medical decision making, and the department course for this patient. I have also

 personally directed, reviewed, and agree with the discharge instructions and 

disposition.

## 2018-12-20 VITALS — OXYGEN SATURATION: 98 %

## 2018-12-20 PROCEDURE — GZ58ZZZ INDIVIDUAL PSYCHOTHERAPY, COGNITIVE-BEHAVIORAL: ICD-10-PCS | Performed by: PSYCHIATRY & NEUROLOGY

## 2018-12-20 PROCEDURE — GZHZZZZ GROUP PSYCHOTHERAPY: ICD-10-PCS | Performed by: PSYCHIATRY & NEUROLOGY

## 2018-12-20 PROCEDURE — GZ56ZZZ INDIVIDUAL PSYCHOTHERAPY, SUPPORTIVE: ICD-10-PCS | Performed by: PSYCHIATRY & NEUROLOGY

## 2018-12-20 RX ADMIN — Medication SCH TAB: at 11:50

## 2018-12-20 NOTE — CARD
--------------- APPROVED REPORT --------------





Date of service: 12/20/2018



EKG Measurement

Heart [id number]

ID [id number]

[id number]

[id number]

[id number]



<Conclusion>

Normal sinus rhythm with sinus arrhythmia

Voltage criteria for left ventricular hypertrophy

Abnormal ECG

## 2018-12-20 NOTE — CT
Date of service: 



12/19/2018



PROCEDURE:  CT HEAD WITHOUT CONTRAST.



HISTORY:

Headache . 



COMPARISON:

None available.



TECHNIQUE:

Axial computed tomography images were obtained through the head/brain 

without intravenous contrast.  



Radiation dose:



Total exam DLP = 1080.76 mGy-cm.



This CT exam was performed using one or more of the following dose 

reduction techniques: Automated exposure control, adjustment of the 

mA and/or kV according to patient size, and/or use of iterative 

reconstruction technique.



FINDINGS:



HEMORRHAGE:

No intracranial hemorrhage. 



BRAIN:

Redemonstrated is a localized area of partially cystic 

encephalomalacia right posterior superior parietal lobe subjacent to 

a right posterior superior parietal craniotomy defect.  Clinical 

correlation with surgical history recommended.



Minor chronic periventricular white matter ischemic changes seen 

extending peripherally into the deep white matter both cerebral 

hemispheres. 



No obvious parenchymal nor extra-axial mass or collection identified 

on this noncontrast exam. 



Mild generalized volume loss. 



VENTRICLES:

No obstructive hydrocephalus. 



CALVARIUM:

Redemonstrated is a right posterior superior parietal craniotomy 

defect.. 



PARANASAL SINUSES:

Unremarkable as visualized. No significant inflammatory changes.



MASTOID AIR CELLS:

Unremarkable as visualized. No inflammatory changes.



OTHER FINDINGS:

Note made of surgical clips and/or possible embolization coils within 

the region of the anterior aspect right pterygopalatine fissure. 



IMPRESSION:

No acute intracranial hemorrhage. 



Stable appearing right posterior superior parietal partially cystic 

encephalomalacia changes subjacent to a right posterior superior 

parietal craniotomy defect.  Clinical correlation with surgical 

history recommended.  



The
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
## 2018-12-20 NOTE — PCM.BM
<Christine Rodriguez - Last Filed: 12/20/18 08:18>





Treatment Plan Problems





- Problems identified on initial assessmt


  ** Depression


Date Initiated: 12/20/18


Time Initiated: 08:19


Assessment reference: NA


Status: Active





  ** Psychosis


Date Initiated: 12/20/18


Time Initiated: 08:20


Assessment reference: NA


Status: Active





Treatment assets and liabiliti


Patient Assests: cooperative, ADL independent, cognitively intact


Patient Liabilities: relationship conflicts, substance abuse





- Milieu Protocol


Maintain good personal hygiene: daily Encourage regular showers


Conduct patient checks and document Observation sheet: Q15 minutes


Maintain personal safety: every shift Educate patient to report safety concerns 

to staff, every shift Monitor environment for contraband/sharps


Medication safety: Monitor for expected outcome, potential side effects: every 

shift, Assess barriers to learning: every shift, Assess readiness for medication

education: every shift





<Vladimir Rangel - Last Filed: 12/21/18 11:33>





- Diagnosis


(1) Depression


Status: Acute   


Interventions: 





12/21/18 11:34


* Assess/adjust medications daily and /or as needed


* See patient on an individual basis 7x/week to assess symptoms of depression


* Monitor for side effects & effectiveness of medications


* 








(2) Alcohol abuse


Status: Acute   


Interventions: 





12/21/18 11:34


* Assess 7x/week regarding severity of withdrawal


* Educate regarding risks, benefits, side effects and alternatives of 

  medications


* Use Motivational Interviewing for abstinence


* Use CBT for relapse prevention


* Medication management for withdrawal symptoms


* Encourage medication assisted treatment


* 








<Allie Mariscal - Last Filed: 12/21/18 12:41>





Family Contact


Family involvement: Family/SO is involved


Family contact: Patient agrees to contact


Family contact name: Le [place]-spouse


Family contacted how many times per week?: 1





- Goals for Treatment


Patient goals for treatment: "I don't know."





Discharge/Continuing Care





- Education Needs


Education Needs: Patient Medication, Patient Coping Skills





- Discharge


Discharge Criteria: Tolerates medication w/o severe side effects, Reduction of 

target symptoms


Discharge to:: Home, With Family





- Treatment Team Participation


Discussed with Family/SO: No


Was Patient/Family/SO present at Treatment Team Meeting: Yes

## 2018-12-21 RX ADMIN — Medication SCH TAB: at 09:04

## 2018-12-22 RX ADMIN — Medication SCH TAB: at 09:26

## 2018-12-22 RX ADMIN — PANTOPRAZOLE SODIUM SCH MG: 40 TABLET, DELAYED RELEASE ORAL at 09:23

## 2018-12-22 NOTE — PCM.PYCHPN
Psychiatric Progress Note





- Psychiatric Progress Note


Patient seen today, length of contact: 16 min


Patient Chief Complaint: 





"Better"


Problems Identified/Issues Discussed: 





The pt is seen, chart reviewed, case discussed with staff.


The pt is compliant with medications and reports no side-effects.


Symptoms are improving but needs more time to stabilize. 


Pt attends groups and activities.


Support given, psycho-education provided. 


After care discussed.


Medication Change: No


Medical Record Reviewed: Yes





Mental Status Examination





- Cognitive Function


Orientation: Person, Place, Situation, Time


Memory: Intact


Attention: WNL


Concentration: Poor


Association: WNL


Fund of Knowledge: Poor





- Mood


Mood: Depressed, Anxious





- Affect


Affect: Constricted, Depressed





- Speech


Speech: Soft





- Formal Thought Process


Formal Thought Process: Paranoia





- Suicidal Ideation


Suicidal Ideation: No





- Homicidal Ideation


Homicidal Ideation: No





Goal/Treatment Plan





- Goal/Treatment Plan


Need for Continued Stay: Severe depression anxiety, Discharge may exacerbated 

symptoms, Severe functional impairment


Progress Toward Problem(s) and Goals/Treatment Plan: 





Continue medications


Support and psychoeducation daily


Attend groups and activities daily


After care planning by ELIZABETH

## 2018-12-22 NOTE — PCM.PYCHPN
Psychiatric Progress Note





- Psychiatric Progress Note


Patient seen today, length of contact: 15 min


Patient Chief Complaint: 





I was feeling depressed


Medication Change: Yes


Medical Record Reviewed: Yes





Mental Status Examination





- Cognitive Function


Orientation: Person, Place, Situation, Time


Memory: Intact


Attention: WNL


Concentration: Poor


Association: WNL


Fund of Knowledge: Poor





- Mood


Mood: Depressed, Anxious





- Affect


Affect: Constricted, Depressed





- Speech


Speech: Soft





- Formal Thought Process


Formal Thought Process: Hallucinations, Delusions, Paranoia





- Suicidal Ideation


Suicidal Ideation: No





- Homicidal Ideation


Homicidal Ideation: No





Goal/Treatment Plan





- Goal/Treatment Plan


Need for Continued Stay: Severe functional impairment


Progress Toward Problem(s) and Goals/Treatment Plan: 








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


Alcohol use disorder severe


Alcohol withdrawal 














CBT


Psychoeducation


Supportive therapy and group therapy


Keppra 1500 mg p.o. twice daily


Lamictal 200 mg p.o. nightly


Trazodone 50 mg p.o. nightly


Hydroxyzine 25 mg p.o. every 6 hours as needed





- Smoking Cessation


Smoking Cessation Initiated: No

## 2018-12-23 RX ADMIN — Medication SCH TAB: at 09:15

## 2018-12-23 RX ADMIN — PANTOPRAZOLE SODIUM SCH MG: 40 TABLET, DELAYED RELEASE ORAL at 09:15

## 2018-12-24 VITALS
DIASTOLIC BLOOD PRESSURE: 72 MMHG | RESPIRATION RATE: 18 BRPM | TEMPERATURE: 97.5 F | SYSTOLIC BLOOD PRESSURE: 122 MMHG | HEART RATE: 60 BPM

## 2018-12-24 RX ADMIN — PANTOPRAZOLE SODIUM SCH MG: 40 TABLET, DELAYED RELEASE ORAL at 09:13

## 2018-12-24 RX ADMIN — Medication SCH TAB: at 09:13
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
## 2019-03-07 ENCOUNTER — HOSPITAL ENCOUNTER (EMERGENCY)
Dept: HOSPITAL 31 - C.ER | Age: 59
Discharge: HOME | End: 2019-03-07
Payer: MEDICARE

## 2019-03-07 VITALS — BODY MASS INDEX: 31.5 KG/M2

## 2019-03-07 VITALS
RESPIRATION RATE: 18 BRPM | SYSTOLIC BLOOD PRESSURE: 132 MMHG | TEMPERATURE: 98.4 F | HEART RATE: 74 BPM | DIASTOLIC BLOOD PRESSURE: 78 MMHG | OXYGEN SATURATION: 96 %

## 2019-03-07 DIAGNOSIS — S01.552A: ICD-10-CM
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
## 2019-03-07 DIAGNOSIS — J02.9: Primary | ICD-10-CM

## 2019-03-07 NOTE — C.PDOC
History Of Present Illness


Patient is a 59 year old male who presents to the ED for evaluation of tongue 

swelling and painful swallowing that began today after he bit his tongue 1 day 

ago. Patient denies any fever or chills. 





Time Seen by Provider: 03/07/19 17:41


Chief Complaint (Nursing): ENT Problem


History Per: Patient


History/Exam Limitations: None


Onset/Duration Of Symptoms: Days (1)


Current Symptoms Are (Timing): Still Present





Past Medical History


Reviewed: Historical Data, Nursing Documentation, Vital Signs


Vital Signs: 





                                Last Vital Signs











Temp  98.4 F   03/07/19 17:34


 


Pulse  74   03/07/19 17:34


 


Resp  18   03/07/19 17:34


 


BP  132/78   03/07/19 17:34


 


Pulse Ox  96   03/07/19 17:34














- Medical History


PMH: HTN, Hypercholesterolemia, Hyperlipidemia, Seizures (Last one was 12/17/18)


   Denies: Anxiety, Diabetes, Hepatitis, HIV, Chronic Kidney Disease, Sexually 

Transmitted Disease


Surgical History: No Surg Hx





- [facility] Procedures











GROUP PSYCHOTHERAPY (12/20/18)


INDIVIDUAL PSYCHOTHERAPY, COGNITIVE-BEHAVIORAL (12/20/18)


INDIVIDUAL PSYCHOTHERAPY, SUPPORTIVE (12/20/18)








Family History: States: Unknown Family Hx





- Social History


Hx Tobacco Use: No


Hx Alcohol Use: Yes (H/o sezeiures.)


Hx Substance Use: No





- Immunization History


Hx Tetanus Toxoid Vaccination: No


Hx Influenza Vaccination: No


Hx Pneumococcal Vaccination: No





Review Of Systems


Constitutional: Negative for: Fever, Chills


ENT: Positive for: Other (tongue swelling and painful swallowing\)





Physical Exam





- Physical Exam


Appears: Well, Non-toxic, No Acute Distress, Other (resting comfortably)


Head: Normacephalic, Other (old surgical scars with indentation)


Tongue: No Swelling, Bite (bite chaitanya on left underside)


Throat: Other (tonsils swollen and erythematous, almost touching, no exudates. 

tender right submandibular gland)


Cardiovascular: Rhythm Regular


Respiratory: Normal Breath Sounds, No Rales, No Rhonchi, No Wheezing


Neurological/Psych: Oriented x3, Normal Speech, Normal Cognition





ED Course And Treatment


O2 Sat by Pulse Oximetry: 96 (on RA)


Pulse Ox Interpretation: Normal





Medical Decision Making


Medical Decision Making: 


Plan:


Tylenol 650mg PO


Serology Rapid Strep


Throat Culture








1855 pt with neg rapid step[ given enlarged tonsils,will treat with antilbitcs. 

 d/c home. f/u pmd








Disposition


Counseled Patient/Family Regarding: Studies Performed, Diagnosis, Need For 

Followup, Rx Given





- Disposition


Referrals: 


Elder Lucero MD [Staff Provider] - 


Disposition: HOME/ ROUTINE


Disposition Time: 18:58


Condition: GOOD


Additional Instructions: 


Rinse mouth with warm salty water several times a day. Take antibiotics as 

prescribed until done/. Follow up with Dr Lucero in a few days. Return to ER for

any worsening symptoms. 





Medications sent to [facility]


Prescriptions: 


Amoxicillin [Amoxil 500 mg Cap] 500 mg PO BID #20 cap


Instructions:  Sore Throat, Adult (DC)


Forms:  [facility] (English), General Discharge Instructions





- Clinical Impression


Clinical Impression: 


 Pharyngitis, Open wound of tongue due to bite








- PA / NP / Resident Statement


MD/DO has examined the patient and agrees with the treatment plan.





- Scribe Statement


The provider has reviewed the documentation as recorded by the Jovon Ragsdale





All medical record entries made by the Brittibshruti were at my direction and 

personally dictated by me. I have reviewed the chart and agree that the record 

accurately reflects my personal performance of the history, physical exam, 

medical decision making, and the department course for this patient. I have also

personally directed, reviewed, and agree with the discharge instructions and 

disposition.

## 2019-03-12 ENCOUNTER — HOSPITAL ENCOUNTER (INPATIENT)
Dept: HOSPITAL 31 - C.ER | Age: 59
LOS: 1 days | Discharge: LEFT BEFORE DISCHARGE | DRG: 101 | End: 2019-03-13
Attending: INTERNAL MEDICINE | Admitting: INTERNAL MEDICINE
Payer: COMMERCIAL

## 2019-03-12 VITALS — BODY MASS INDEX: 33.5 KG/M2

## 2019-03-12 DIAGNOSIS — Z85.038: ICD-10-CM

## 2019-03-12 DIAGNOSIS — G40.909: Primary | ICD-10-CM

## 2019-03-12 DIAGNOSIS — E78.5: ICD-10-CM

## 2019-03-12 DIAGNOSIS — G93.89: ICD-10-CM

## 2019-03-12 DIAGNOSIS — R29.810: ICD-10-CM

## 2019-03-12 DIAGNOSIS — I10: ICD-10-CM

## 2019-03-12 DIAGNOSIS — Z86.011: ICD-10-CM

## 2019-03-12 LAB
ALBUMIN SERPL-MCNC: 4.8 G/DL (ref 3.5–5)
ALBUMIN/GLOB SERPL: 1.2 {RATIO} (ref 1–2.1)
ALT SERPL-CCNC: 65 U/L (ref 21–72)
APTT BLD: 29 SECONDS (ref 21–34)
AST SERPL-CCNC: 76 U/L (ref 17–59)
BASOPHILS # BLD AUTO: 0.1 K/UL (ref 0–0.2)
BASOPHILS NFR BLD: 0.9 % (ref 0–2)
BUN SERPL-MCNC: 13 MG/DL (ref 9–20)
CALCIUM SERPL-MCNC: 9.6 MG/DL (ref 8.6–10.4)
EOSINOPHIL # BLD AUTO: 0.2 K/UL (ref 0–0.7)
EOSINOPHIL NFR BLD: 3.6 % (ref 0–4)
ERYTHROCYTE [DISTWIDTH] IN BLOOD BY AUTOMATED COUNT: 13 % (ref 11.5–14.5)
GFR NON-AFRICAN AMERICAN: > 60
HDLC SERPL-MCNC: 53 MG/DL (ref 30–70)
HGB BLD-MCNC: 16.2 G/DL (ref 12–18)
INR PPP: 1.1
LDLC SERPL-MCNC: 113 MG/DL (ref 0–129)
LYMPHOCYTES # BLD AUTO: 1.4 K/UL (ref 1–4.3)
LYMPHOCYTES NFR BLD AUTO: 23.8 % (ref 20–40)
MCH RBC QN AUTO: 30.8 PG (ref 27–31)
MCHC RBC AUTO-ENTMCNC: 33.3 G/DL (ref 33–37)
MCV RBC AUTO: 92.7 FL (ref 80–94)
MONOCYTES # BLD: 0.6 K/UL (ref 0–0.8)
MONOCYTES NFR BLD: 10.6 % (ref 0–10)
NEUTROPHILS # BLD: 3.6 K/UL (ref 1.8–7)
NEUTROPHILS NFR BLD AUTO: 61.1 % (ref 50–75)
NRBC BLD AUTO-RTO: 0 % (ref 0–2)
PLATELET # BLD: 206 K/UL (ref 130–400)
PMV BLD AUTO: 8.4 FL (ref 7.2–11.7)
PROTHROMBIN TIME: 11.7 SECONDS (ref 9.7–12.2)
RBC # BLD AUTO: 5.26 MIL/UL (ref 4.4–5.9)
WBC # BLD AUTO: 5.8 K/UL (ref 4.8–10.8)

## 2019-03-12 NOTE — RAD
Date of service: 



03/12/2019



HISTORY:

 Code Stroke 



COMPARISON:

9/16/2018 



FINDINGS:



LUNGS:

No consolidation.  Inspiration is shallow.  Overall bronchovascular 

markings accentuated-believed to crowding/hypoventilation and large 

body habitus.



Probable summation of soft tissues over the left costophrenic angle. 



PLEURA:

No significant pleural effusion identified, no pneumothorax apparent.



CARDIOVASCULAR:

No aortic atherosclerotic calcification present.



Mild cardiomegaly suspect even allowing for shallow inspiration and 

large body habitus and projection.  No significant appearing 

pulmonary venous congestion.  Degrees of pulmonary venous congestion 

not excluded.



OSSEOUS STRUCTURES:

Thoracic spondylosis.



VISUALIZED UPPER ABDOMEN:

Smaller metallic like density projecting over the right hemidiaphragm 

in faintly perceived below the right hemidiaphragm more clearly 

depicted on the prior study.  No change here noted.



OTHER FINDINGS:

None.



IMPRESSION:

No interval pathology noted.  No definitive infiltrate. 



Mild cardiomegaly present. No significant appearing pulmonary venous 

congestion.  Minimal degrees of pulmonary venous congestion not 

excluded.

## 2019-03-12 NOTE — CT
Date of service: 



03/12/2019



PROCEDURE:  CT HEAD WITHOUT CONTRAST.



HISTORY:

Code Stroke.  History of benign tumor resection.  Seizures.. 



COMPARISON:

No prior study available for comparison.



TECHNIQUE:

Axial computed tomography images were obtained through the head/brain 

without intravenous contrast.  



Radiation dose:



Total exam DLP = 1397.6 mGy-cm.



This CT exam was performed using one or more of the following dose 

reduction techniques: Automated exposure control, adjustment of the 

mA and/or kV according to patient size, and/or use of iterative 

reconstruction technique.



FINDINGS:



HEMORRHAGE:

No acute parenchymal, subarachnoid or extra-axial hemorrhage. 



BRAIN:

There is a localize which shaped area encephalomalacia in the right 

posterior parietal region subjacent to a craniotomy defect consistent 

with this patient's history of prior benign tumor resection.  Note 

there appears to be residual extra-axial mass possibly representing 

meningioma which measures approximately 16.7 x 10 mm.  Follow-up pre 

and post-contrast MRI of the brain could be performed for further 

evaluation.



Suspect minor chronic periventricular white matter ischemic changes 



VENTRICLES:

No obstructive hydrocephalus although there is slight dilatation of 

the right atrium felt to be on an ex vacuo basis 



CALVARIUM:

Calvarium otherwise intact



PARANASAL SINUSES:

Mild mucosal thickening seen within both maxillary antra left greater 

than right as well as multiple ethmoid air cells extending superiorly 

into the frontal sinus.  In addition, there are postsurgical changes 

seen abutting the posterolateral wall right maxillary antrum along 

the lateral margin of the right pterygopalatine fossa.  Clinical 

correlation 



MASTOID AIR CELLS:

Unremarkable as visualized. No inflammatory changes.



OTHER FINDINGS:

None.



IMPRESSION:

No acute intracranial hemorrhage.  



Wedge-shaped area encephalomalacia right posterior parietal region 

subjacent to a old craniotomy defect.  Suspect residual meningioma 

along the right lateral margin of the surgical cavity subjacent to 

the craniotomy defect along its lateral border...  Note that the 

possibility of a small hyperacute infarct not excluded. 



Note these findings were discussed with Dr. Martin at approximately 

12:32 p.m. with written down and read back verification

## 2019-03-12 NOTE — CT
Date of service: 



03/12/2019



PROCEDURE:  CT Angiography of the neck and brain 



HISTORY:

Left-sided weakness, hx of seizure/brain tumor resect



COMPARISON:

Comparison made with concurrent noncontrast CT scan of the brain



TECHNIQUE:

Contiguous axial images of the neck and brain were obtained from the 

level of the vertex of the skull to the superior mediastinum in the 

arteriographic phase of enhancement. Coronal and sagittal reformats 

or also generated. 



IV contrast dose: 100 cc Visipaque 320 contrast material.



Radiation dose:



Total exam DLP = 693.8 mGy-cm.



This CT exam was performed using one or more of the following dose 

reduction techniques: Automated exposure control, adjustment of the 

mA and/or kV according to patient size, and/or use of iterative 

reconstruction technique.



FINDINGS:

Minor aortic atherosclerotic calcified plaque changes seen along the 

the distal aspect of the aortic arch and proximal aspect of the 

descending thoracic aorta.



The origins of the great vessels are widely patent.



Both common carotid arteries included carotid bifurcations are also 

patent without evidence of occlusion dissection or significant 

stenosis despite tiny calcified plaque along the medial aspect left 

carotid bifurcation and proximal left internal carotid artery.. 



The internal carotid arteries including the petrous cavernous and 

supraclinoid segments are patent.. Smiled calcified plaque seen along 

along the right cavernous carotid artery which results in mild 

narrowing. 



The vertebral arteries are patent and relatively symmetric 

throughout. Basilar artery is also patent. 



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 The both A2 segments are 

fed from the left A1 segment.  The distal branches of the anterior 

middle and posterior cerebral arteries are also patent. 



No definitive evidence of large aneurysm nor vascular malformation.



OTHER FINDINGS:

Note made of several peripherally enhancing semi lunar shaped 

extra-axial densities subjacent to the craniotomy defect which may 

represent residual- recurrent tumor likely meningioma. Follow-up pre 

and post-contrast MRI of the brain is recommended for further 

evaluation. 



IMPRESSION:

There is no evidence of occlusion significant stenosis or dissection.



There atria of the right A1 segment with both A2 segments fed from 

the left A1.



Note made of several peripherally enhancing semi lunar shaped 

extra-axial densities subjacent to the craniotomy defect which may 

represent residual- recurrent tumor likely meningioma. Follow-up pre 

and post-contrast MRI of the brain is recommended for further 

evaluation.

## 2019-03-12 NOTE — CP.PCM.HP
History of Present Illness





- History of Present Illness


History of Present Illness: 





60 y/o male,w/PMhx of HTN, hypercholestereloremia, brain mass resection, 

epilepsy (on Keppra), colon cancer,  brought to ER by ambulance for evaluation 

of left sided weakness vs possible seizure. Per EMS, patient was talking 

normally upon arrival to his home. EMS notes that patient soon became 

intermittently non-verbal and he had empty stare into the distance.  EMS reports

that they noticed patient has mild LUE and LLE weakness. They did note not any 

signs of trauma. They state that patient normally takes Keppra for seizures. Of 

note, Code Stroke was called at 11:57 AM in the ER.





PT HAD SIMILAR COMPLAINTS IN 2018 WAS ADMITTED WITH NO NEW NEUROLOGICAL FINDINGS







Present on Admission





- Present on Admission


Any Indicators Present on Admission: No





Review of Systems





- Review of Systems


All systems: reviewed and no additional remarkable complaints except (AS STATED 

IN HPI)





Past Patient History





- Infectious Disease


Hx of Infectious Diseases: None





- Past Medical History & Family History


Past Medical History?: Yes





- Past Social History


Smoking Status: Never Smoked





- CARDIAC


Hx Hypercholesterolemia: Yes


Hx Hypertension: Yes





- PULMONARY


Hx Tuberculosis: No





- NEUROLOGICAL


Hx Seizures: Yes (Last one was 12/17/18)





- HEENT


Hx HEENT Problems: No





- RENAL


Hx Chronic Kidney Disease: No





- ENDOCRINE/METABOLIC


Hx Endocrine Disorders: No





- HEMATOLOGICAL/ONCOLOGICAL


Hx Human Immunodeficiency Virus (HIV): No





- INTEGUMENTARY


Hx Dermatological Problems: No





- MUSCULOSKELETAL/RHEUMATOLOGICAL


Hx Falls: No





- GASTROINTESTINAL


Hx Gastrointestinal Disorders: Yes


Hx Bowel Surgery: Yes (sigmoid colectomy)





- GENITOURINARY/GYNECOLOGICAL


Hx Sexually Transmitted Disorders: No





- PSYCHIATRIC


Hx Anxiety: No


Hx Substance Use: No





- SURGICAL HISTORY


Hx Surgeries: Yes


Other/Comment: Sigmoid Colectomy





- ANESTHESIA


Hx Anesthesia: Yes


Hx Anesthesia Reactions: No


Hx Malignant Hyperthermia: No





Meds


Allergies/Adverse Reactions: 


                                    Allergies











Allergy/AdvReac Type Severity Reaction Status Date / Time


 


seafood Allergy  SHORTNESS Uncoded 08/20/18 16:59





   OF BREATH  














Physical Exam





- Constitutional


Appears: Well





- Head Exam


Head Exam: ATRAUMATIC, NORMAL INSPECTION, NORMOCEPHALIC





- Eye Exam


Eye Exam: EOMI, Normal appearance, PERRL





- ENT Exam


ENT Exam: Mucous Membranes Moist, Normal Exam





- Neck Exam


Neck exam: Positive for: Normal Inspection





- Respiratory Exam


Respiratory Exam: Clear to Auscultation Bilateral, NORMAL BREATHING PATTERN





- Cardiovascular Exam


Cardiovascular Exam: REGULAR RHYTHM





- GI/Abdominal Exam


GI & Abdominal Exam: Normal Bowel Sounds, Soft.  absent: Tenderness





- Extremities Exam


Extremities exam: Positive for: normal inspection





- Back Exam


Back exam: NORMAL INSPECTION





- Neurological Exam


Neurological exam: Alert, CN II-XII Intact, Oriented x3, Reflexes Normal





Results





- Vital Signs


Recent Vital Signs: 





                                Last Vital Signs











Temp  98.7 F   03/12/19 12:00


 


Pulse  61   03/12/19 14:45


 


Resp  20   03/12/19 14:45


 


BP  172/101 H  03/12/19 14:45


 


Pulse Ox  98   03/12/19 14:45














- Labs


Result Diagrams: 


                                 03/12/19 12:04





                                 03/12/19 12:04


Labs: 





                         Laboratory Results - last 24 hr











  03/12/19 03/12/19 03/12/19





  11:54 12:04 12:04


 


WBC   5.8 


 


RBC   5.26 


 


Hgb   16.2 


 


Hct   48.8 


 


MCV   92.7 


 


MCH   30.8 


 


MCHC   33.3 


 


RDW   13.0 


 


Plt Count   206 


 


MPV   8.4 


 


Neut % (Auto)   61.1 


 


Lymph % (Auto)   23.8 


 


Mono % (Auto)   10.6 H 


 


Eos % (Auto)   3.6 


 


Baso % (Auto)   0.9 


 


Neut # (Auto)   3.6 


 


Lymph # (Auto)   1.4 


 


Mono # (Auto)   0.6 


 


Eos # (Auto)   0.2 


 


Baso # (Auto)   0.1 


 


PT    11.7


 


INR    1.1


 


APTT    29


 


Sodium   


 


Potassium   


 


Chloride   


 


Carbon Dioxide   


 


Anion Gap   


 


BUN   


 


Creatinine   


 


Est GFR ( Amer)   


 


Est GFR (Non-Af Amer)   


 


POC Glucose (mg/dL)  155 H  


 


Random Glucose   


 


Hemoglobin A1c   


 


Calcium   


 


Total Bilirubin   


 


AST   


 


ALT   


 


Alkaline Phosphatase   


 


Troponin I   


 


Total Protein   


 


Albumin   


 


Globulin   


 


Albumin/Globulin Ratio   


 


Triglycerides   


 


Cholesterol   


 


LDL Cholesterol Direct   


 


HDL Cholesterol   


 


Alcohol, Quantitative   


 


Blood Type   


 


Antibody Screen   














  03/12/19 03/12/19 03/12/19





  12:04 12:04 12:04


 


WBC   


 


RBC   


 


Hgb   


 


Hct   


 


MCV   


 


MCH   


 


MCHC   


 


RDW   


 


Plt Count   


 


MPV   


 


Neut % (Auto)   


 


Lymph % (Auto)   


 


Mono % (Auto)   


 


Eos % (Auto)   


 


Baso % (Auto)   


 


Neut # (Auto)   


 


Lymph # (Auto)   


 


Mono # (Auto)   


 


Eos # (Auto)   


 


Baso # (Auto)   


 


PT   


 


INR   


 


APTT   


 


Sodium  137  


 


Potassium  4.1  


 


Chloride  101  


 


Carbon Dioxide  28  


 


Anion Gap  12  


 


BUN  13  


 


Creatinine  0.8  


 


Est GFR ( Amer)  > 60  


 


Est GFR (Non-Af Amer)  > 60  


 


POC Glucose (mg/dL)   


 


Random Glucose  152 H  


 


Hemoglobin A1c   5.9 


 


Calcium  9.6  


 


Total Bilirubin  0.6  


 


AST  76 H D  


 


ALT  65  


 


Alkaline Phosphatase  78  


 


Troponin I  < 0.0120  


 


Total Protein  8.7 H  


 


Albumin  4.8  


 


Globulin  3.9  


 


Albumin/Globulin Ratio  1.2  


 


Triglycerides  206 H  


 


Cholesterol  181  


 


LDL Cholesterol Direct  113  


 


HDL Cholesterol  53  


 


Alcohol, Quantitative  < 10  


 


Blood Type    A POSITIVE


 


Antibody Screen    Negative














Assessment & Plan


(1) Seizure


Status: Acute   


Comment: NEURO EVAL.  KEPPRE AND LAMICTAL   





(2) Meningioma determined by biopsy of brain


Status: Acute   





(3) Hx of resection of meningioma


Status: Acute

## 2019-03-12 NOTE — CP.PCM.CON
<Vale Dorado P - Last Filed: 03/12/19 19:31>





History of Present Illness





- History of Present Illness


History of Present Illness: 


Consult note for Dr. Sutherland. 





Patient is a 59 year old male with PMHx of epilepsy, previously resected benign 

right parietal lobe mass, HTN, and HLD who was brought in by BLS for seizure vs 

CVA. Patient states that he had his family call an ambulance because he felt a 

seizure coming on. When BLS arrived, patient was noticed to have LUE weakness 

and L facial droop. Code stroke was called in the ED. At the time of my exam, 

patient appears to be postictal. He is complaining of nausea. Patient denies 

focal weakness, numbness, tingling, and blurry vision.





CT head: No acute intracranial hemorrhage. Encephalomalacia to right posterior 

parietal region subjacent to a old craniotomy defect.  Suspect residual 

meningioma along the right lateral margin of the surgical cavity subjacent to 

the craniotomy defect along its lateral border.


CTA head/neck: There is no evidence of occlusion significant stenosis or 

dissection. Note made of several peripherally enhancing semi lunar shaped extra-

axial densities subjacent to the craniotomy defect which may represent residual-

recurrent tumor likely meningioma. Follow-up pre and post-contrast MRI of the 

brain is recommended for further evaluation.





PMHx: epilepsy, previously resected benign right parietal lobe mass, HTN, and 

HLD


PSHx: brain mass resection 2013


Allg: NKDA, seafood


Meds: Keppra 1500 BID, Lamictal XR 400mg QPM, see medication list for full list


Social: denies tobacco and illicit drugs, drinks 3-5 beers daily, last drank 

last night


Family hx: Mother and sister- diabetes and "heart condition"








Review of Systems





- Review of Systems


All systems: reviewed and no additional remarkable complaints except (as per 

HPI)





Past Patient History





- Infectious Disease


Hx of Infectious Diseases: None





- Past Medical History & Family History


Past Medical History?: Yes





- Past Social History


Smoking Status: Never Smoked





- CARDIAC


Hx Hypercholesterolemia: Yes


Hx Hypertension: Yes





- PULMONARY


Hx Tuberculosis: No





- NEUROLOGICAL


Hx Seizures: Yes (Last one was 12/17/18)





- HEENT


Hx HEENT Problems: No





- RENAL


Hx Chronic Kidney Disease: No





- ENDOCRINE/METABOLIC


Hx Endocrine Disorders: No





- HEMATOLOGICAL/ONCOLOGICAL


Hx Human Immunodeficiency Virus (HIV): No





- INTEGUMENTARY


Hx Dermatological Problems: No





- MUSCULOSKELETAL/RHEUMATOLOGICAL


Hx Falls: No





- GASTROINTESTINAL


Hx Gastrointestinal Disorders: Yes


Hx Bowel Surgery: Yes (sigmoid colectomy)





- GENITOURINARY/GYNECOLOGICAL


Hx Sexually Transmitted Disorders: No





- PSYCHIATRIC


Hx Anxiety: No


Hx Substance Use: No





- SURGICAL HISTORY


Hx Surgeries: Yes


Other/Comment: Sigmoid Colectomy





- ANESTHESIA


Hx Anesthesia: Yes


Hx Anesthesia Reactions: No


Hx Malignant Hyperthermia: No





Meds


Allergies/Adverse Reactions: 


                                    Allergies











Allergy/AdvReac Type Severity Reaction Status Date / Time


 


seafood Allergy  SHORTNESS Uncoded 08/20/18 16:59





   OF BREATH  














Physical Exam





- Constitutional


Appears: Non-toxic, No Acute Distress





- Head Exam


Head Exam: ATRAUMATIC, NORMOCEPHALIC





- Eye Exam


Eye Exam: EOMI


Additional comments: 


Pupils 4mm, not reactive to light








- ENT Exam


ENT Exam: Mucous Membranes Moist





- Neck Exam


Neck exam: Positive for: Full Rom, Normal Inspection





- Respiratory Exam


Respiratory Exam: Clear to Auscultation Bilateral, NORMAL BREATHING PATTERN.  

absent: Rales, Rhonchi, Wheezes





- Cardiovascular Exam


Cardiovascular Exam: Bradycardia, +S1, +S2





- GI/Abdominal Exam


GI & Abdominal Exam: Normal Bowel Sounds, Soft.  absent: Tenderness





- Extremities Exam


Extremities exam: Positive for: normal inspection.  Negative for: tenderness





- Neurological Exam


Additional comments: 


Awake, oriented to person, place and time, however confused intermittently and 

at times noted to stare into the distance, but easily redirected. Follows most 

commands. Minimal L facial droop. Difficulty focusing on horizontal gaze. Vision

intact. Speech is fluent. Moves all extremities. 








- Skin


Skin Exam: Dry, Intact, Normal Color, Warm





Results





- Vital Signs


Recent Vital Signs: 


                                Last Vital Signs











Temp  98.7 F   03/12/19 12:00


 


Pulse  59 L  03/12/19 13:15


 


Resp  12   03/12/19 13:15


 


BP  210/108 H  03/12/19 13:15


 


Pulse Ox  97   03/12/19 13:15














- Labs


Result Diagrams: 


                                 03/12/19 12:04





                                 03/12/19 12:04


Labs: 


                         Laboratory Results - last 24 hr











  03/12/19 03/12/19 03/12/19





  12:04 12:04 12:04


 


WBC  5.8  


 


RBC  5.26  


 


Hgb  16.2  


 


Hct  48.8  


 


MCV  92.7  


 


MCH  30.8  


 


MCHC  33.3  


 


RDW  13.0  


 


Plt Count  206  


 


MPV  8.4  


 


Neut % (Auto)  61.1  


 


Lymph % (Auto)  23.8  


 


Mono % (Auto)  10.6 H  


 


Eos % (Auto)  3.6  


 


Baso % (Auto)  0.9  


 


Neut # (Auto)  3.6  


 


Lymph # (Auto)  1.4  


 


Mono # (Auto)  0.6  


 


Eos # (Auto)  0.2  


 


Baso # (Auto)  0.1  


 


PT   11.7 


 


INR   1.1 


 


APTT   29 


 


Sodium    137


 


Potassium    4.1


 


Chloride    101


 


Carbon Dioxide    28


 


Anion Gap    12


 


BUN    13


 


Creatinine    0.8


 


Est GFR ( Amer)    > 60


 


Est GFR (Non-Af Amer)    > 60


 


Random Glucose    152 H


 


Hemoglobin A1c   


 


Calcium    9.6


 


Total Bilirubin    0.6


 


AST    76 H D


 


ALT    65


 


Alkaline Phosphatase    78


 


Troponin I    < 0.0120


 


Total Protein    8.7 H


 


Albumin    4.8


 


Globulin    3.9


 


Albumin/Globulin Ratio    1.2


 


Triglycerides    206 H


 


Cholesterol    181


 


LDL Cholesterol Direct    113


 


HDL Cholesterol    53


 


Alcohol, Quantitative    < 10


 


Blood Type   


 


Antibody Screen   














  03/12/19 03/12/19





  12:04 12:04


 


WBC  


 


RBC  


 


Hgb  


 


Hct  


 


MCV  


 


MCH  


 


MCHC  


 


RDW  


 


Plt Count  


 


MPV  


 


Neut % (Auto)  


 


Lymph % (Auto)  


 


Mono % (Auto)  


 


Eos % (Auto)  


 


Baso % (Auto)  


 


Neut # (Auto)  


 


Lymph # (Auto)  


 


Mono # (Auto)  


 


Eos # (Auto)  


 


Baso # (Auto)  


 


PT  


 


INR  


 


APTT  


 


Sodium  


 


Potassium  


 


Chloride  


 


Carbon Dioxide  


 


Anion Gap  


 


BUN  


 


Creatinine  


 


Est GFR ( Amer)  


 


Est GFR (Non-Af Amer)  


 


Random Glucose  


 


Hemoglobin A1c  5.9 


 


Calcium  


 


Total Bilirubin  


 


AST  


 


ALT  


 


Alkaline Phosphatase  


 


Troponin I  


 


Total Protein  


 


Albumin  


 


Globulin  


 


Albumin/Globulin Ratio  


 


Triglycerides  


 


Cholesterol  


 


LDL Cholesterol Direct  


 


HDL Cholesterol  


 


Alcohol, Quantitative  


 


Blood Type   A POSITIVE


 


Antibody Screen   Negative














Assessment & Plan





- Assessment and Plan (Free Text)


Assessment: 


59 year old male with PMHx of epilepsy, previously resected benign right 

parietal lobe mass, HTN, and HLD who was brought in by BLS for seizure vs CVA.





Plan: 


CT head: No acute intracranial hemorrhage. Encephalomalacia to right posterior 

parietal region subjacent to a old craniotomy defect.  Suspect residual 

meningioma along the right lateral margin of the surgical cavity subjacent to 

the craniotomy defect along its lateral border.


CTA head/neck: There is no evidence of occlusion significant stenosis or 

dissection. Note made of several peripherally enhancing semi lunar shaped extra-

axial densities subjacent to the craniotomy defect which may represent residual-

recurrent tumor likely meningioma. Follow-up pre and post-contrast MRI of the 

brain is recommended for further evaluation.





-load with Keppra 1500mg IV once


-lamictal 200mg once


-Continue home meds Keppra 1500mg PO BID, Lamictal 200mg BID (conversion for 

home med Lamictal XR 400mg QD)


-EEG





Case discused with Dr. Ena Dorado, PGY-1








<Carson Sutherland - Last Filed: 03/14/19 16:12>





Results





- Vital Signs


Recent Vital Signs: 


                                Last Vital Signs











Temp  97.9 F   03/13/19 08:17


 


Pulse  72   03/13/19 12:37


 


Resp  20   03/13/19 08:17


 


BP  157/92 H  03/13/19 08:17


 


Pulse Ox  98   03/13/19 12:37














- Labs


Result Diagrams: 


                                 03/12/19 12:04





                                 03/12/19 12:04





Attending/Attestation





- Attestation


I have personally seen and examined this patient.: Yes


I have fully participated in the care of the patient.: Yes


I have reviewed all pertinent clinical information: Yes


Notes (Text): 





I agree with the assessment and plan.  Consult requested by Dr. Morin.  Likely 

seizure and post ictal state due to medication non-compliance.  Will resume 

Keppra and Lamictal and follow exam and results. 





Thank you.

## 2019-03-12 NOTE — C.PDOC
History Of Present Illness


58 y/o male, w/ pmhx of HTN, HLD, brain mass resection, epilepsy (on Keppra), 

colon cancer, brought to ER by ambulance for evaluation of left sided weakness 

vs possible seizure. Per EMS, patient was talking normally upon arrival to his 

home. EMS notes that patient soon became intermittently non-verbal and he had 

empty stare into the distance.  EMS reports that they noticed patient has mild 

LUE and LLE weakness. They did not note any signs of trauma. They state that 

patient normally takes Keppra for seizures. Of note, Code Stroke was called at 

11:57 AM in the ER.


Time Seen by Provider: 19 12:00


History Per: EMS


History/Exam Limitations: clinical condition


Onset/Duration Of Symptoms: Hrs


Current Symptoms Are (Timing): Still Present


Severity: Moderate





Past Medical History


Reviewed: Historical Data, Nursing Documentation, Vital Signs





- Medical History


PMH: HTN, Hypercholesterolemia, Hyperlipidemia, Seizures (Last one was 16)


   Denies: Anxiety, Diabetes, Hepatitis, HIV, Chronic Kidney Disease, Sexually 

Transmitted Disease


Other Surgeries: Hx of surgeries





- [facility] Procedures











GROUP PSYCHOTHERAPY (18)


INDIVIDUAL PSYCHOTHERAPY, COGNITIVE-BEHAVIORAL (18)


INDIVIDUAL PSYCHOTHERAPY, SUPPORTIVE (18)








Family History: States: No Known Family Hx





- Social History


Hx Tobacco Use: No


Hx Alcohol Use: Yes (H/o sezeiures.)


Hx Substance Use: No





- Immunization History


Hx Tetanus Toxoid Vaccination: No


Hx Influenza Vaccination: No


Hx Pneumococcal Vaccination: No





Review Of Systems


Review Of Systems: ROS cannot be obtained secondary to pt's inabilty to answer 

questions.





Physical Exam





- Physical Exam


Appears: Non-toxic, No Acute Distress


Skin: Normal Color, Warm


Head: Atraumatic, Normacephalic


Eye(s): bilateral: Normal Inspection, PERRL, EOMI


Ear(s): Bilateral: Normal


Nose: Normal


Oral Mucosa: Moist


Tongue: Normal Appearing


Lips: Normal Appearing


Throat: Normal, No Erythema, No Exudate


Neck: Normal, Normal ROM, Supple, Other (no meningeal signs)


Lymphatic: Normal Exam


Chest: Symmetrical


Cardiovascular: Rhythm Regular


Respiratory: Normal Breath Sounds, No Decreased Breath Sounds, No Accessory 

Muscle Use, No Rales, No Rhonchi, No Stridor, No Wheezing


Gastrointestinal/Abdominal: Normal Exam, Soft, No Tenderness, No Organomegaly, 

No Mass, No Distention, No Guarding, No Rebound


Back: Normal Inspection, No CVA Tenderness, No Vertebral Tenderness


Extremity: Normal ROM, Other (intermittent 5/5 followed by 1/5 strength 

depending on mental status of pt)


Extremity: Bilateral: Atraumatic


Pulses: Left Radial: Normal, Right Radial: Normal, Left Dorsalis Pedis: Normal, 

Right Dorsalis Pedis: Normal


Neurological/Psych: Oriented x3, No Normal Speech, Normal Cognition, No 

Cerebellar Signs, Normal Motor, Other (intermittently oriented, w/ intermittent 

normal neuro exam except some mildly slurred speech)


Other Neurological Findings: No Tongue Deviation





ED Course And Treatment





- Laboratory Results


Result Diagrams: 


                                 19 12:04





                                 19 12:04


ECG: Interpreted By Me, Viewed By Me


ECG Rhythm: Sinus Bradycardia


Rate From EC





- Other Rad


  ** CXR


X-Ray: Viewed By Me, Read By Radiologist


Interpretation: Date of service:  2019.  HISTORY:  Code Stroke.  

COMPARISON:  2018.  FINDINGS:  LUNGS:  No consolidation.  Inspiration is 

shallow.  Overall bronchovascular markings accentuated-believed to 

crowding/hypoventilation and large body habitus.  Probable summation of soft 

tissues over the left costophrenic angle.  PLEURA:  No significant pleural 

effusion identified, no pneumothorax apparent.  CARDIOVASCULAR:  No aortic 

atherosclerotic calcification present.  Mild cardiomegaly suspect even allowing 

for shallow inspiration and large body habitus and projection.  No significant 

appearing pulmonary venous congestion.  Degrees of pulmonary venous congestion 

not excluded.  OSSEOUS STRUCTURES:  Thoracic spondylosis.  VISUALIZED UPPER 

ABDOMEN:  Smaller metallic like density projecting over the right hemidiaphragm 

in faintly perceived below the right hemidiaphragm more clearly depicted on the 

prior study.  No change here noted.  OTHER FINDINGS:  None.  IMPRESSION:  No 

interval pathology noted.  No definitive infiltrate.  Mild cardiomegaly present.

No significant appearing pulmonary venous congestion.  Minimal degrees of 

pulmonary venous congestion not excluded.





- CT Scan/US


  ** CT-Head


Other Rad Studies (CT/US): Read By Radiologist, Radiology Report Reviewed


CT/US Interpretation: Date of service:  2019.  PROCEDURE:  CT HEAD WITHOUT

CONTRAST.  HISTORY:  Code Stroke.  History of benign tumor resection.  

Seizures..  COMPARISON:  No prior study available for comparison.  TECHNIQUE:  

Axial computed tomography images were obtained through the head/brain without 

intravenous contrast.  Radiation dose:  Total exam DLP = 1397.6 mGy-cm.  This CT

exam was performed using one or more of the following dose reduction techniques:

Automated exposure control, adjustment of the mA and/or kV according to patient 

size, and/or use of iterative reconstruction technique.  FINDINGS:  HEMORRHAGE: 

No acute parenchymal, subarachnoid or extra-axial hemorrhage.  BRAIN:  There is 

a localize which shaped area encephalomalacia in the right posterior parietal 

region subjacent to a craniotomy defect consistent with this patient's history 

of prior benign tumor resection.  Note there appears to be residual extra-axial 

mass possibly representing meningioma which measures approximately 16.7 x 10 mm.

 Follow-up pre and post-contrast MRI of the brain could be performed for further

evaluation.  Suspect minor chronic periventricular white matter ischemic 

changes.  VENTRICLES:  No obstructive hydrocephalus although there is slight 

dilatation of the right atrium felt to be on an ex vacuo basis.  CALVARIUM:  

Calvarium otherwise intact.  PARANASAL SINUSES:  Mild mucosal thickening seen 

within both maxillary antra left greater than right as well as multiple ethmoid 

air cells extending superiorly into the frontal sinus.  In addition, there are 

postsurgical changes seen abutting the posterolateral wall right maxillary 

antrum along the lateral margin of the right pterygopalatine fossa.  Clinical 

correlation.  MASTOID AIR CELLS:  Unremarkable as visualized. No inflammatory 

changes.  OTHER FINDINGS:  None.  IMPRESSION:  No acute intracranial hemorrhage.

 Wedge-shaped area encephalomalacia right posterior parietal region subjacent to

a old craniotomy defect.  Suspect residual meningioma along the right lateral 

margin of the surgical cavity subjacent to the craniotomy defect along its 

lateral border...  Note that the possibility of a small hyperacute infarct not e

xcluded.  Note these findings were discussed with Dr. Martin at approximately 12:32

p.m. with written down and read back verification





  ** CTA-Head/Neck


Other Rad Studies (CT/US): Read By Radiologist, Radiology Report Reviewed


CT/US Interpretation: Date of service:  2019.  PROCEDURE:  CT Angiography 

of the neck and brain.  HISTORY:  Left-sided weakness, hx of seizure/brain tumor

resect.  COMPARISON:  Comparison made with concurrent noncontrast CT scan of the

brain.  TECHNIQUE:  Contiguous axial images of the neck and brain were obtained 

from the level of the vertex of the skull to the superior mediastinum in the 

arteriographic phase of enhancement. Coronal and sagittal reformats or also 

generated.  IV contrast dose: 100 cc Visipaque 320 contrast material.  Radiation

dose:  Total exam DLP = 693.8 mGy-cm.  This CT exam was performed using one or 

more of the following dose reduction techniques: Automated exposure control, 

adjustment of the mA and/or kV according to patient size, and/or use of 

iterative reconstruction technique.  FINDINGS:  Minor aortic atherosclerotic 

calcified plaque changes seen along the the distal aspect of the aortic arch and

proximal aspect of the descending thoracic aorta.  The origins of the great 

vessels are widely patent.  Both common carotid arteries included carotid 

bifurcations are also patent without evidence of occlusion dissection or 

significant stenosis despite tiny calcified plaque along the medial aspect left 

carotid bifurcation and proximal left internal carotid artery..  The internal ca

rotid arteries including the petrous cavernous and supraclinoid segments are 

patent.. Smiled calcified plaque seen along along the right cavernous carotid 

artery which results in mild narrowing.  The vertebral arteries are patent and 

relatively symmetric throughout. Basilar artery is also patent.  There is 

atresia of the right A1 segment. The both A2 segments are fed from the left A1 

segment.  The distal branches of the anterior middle and posterior cerebral 

arteries are also patent.  No definitive evidence of large aneurysm nor vascular

malformation.  OTHER FINDINGS:  Note made of several peripherally enhancing semi

lunar shaped extra-axial densities subjacent to the craniotomy defect which may 

represent residual- recurrent tumor likely meningioma. Follow-up pre and post-

contrast MRI of the brain is recommended for further evaluation.  IMPRESSION:  

There is no evidence of occlusion significant stenosis or dissection.  There 

atria of the right A1 segment with both A2 segments fed from the left A1.  Note 

made of several peripherally enhancing semi lunar shaped extra-axial densities 

subjacent to the craniotomy defect which may represent residual- recurrent tumor

likely meningioma. Follow-up pre and post-contrast MRI of the brain is 

recommended for further evaluation.





NIHSS Stroke Scale





- Date/Time Evaluation Performed


Time Performed: 12:07


When Was NIHSS Performed: Baseline





- How Severe is the Stroke


Level of Consciousness: 0=Alert


LOC to Questions: 0=Both comments correct


LOC to commands: 0=Obeys both correctly


Best Gaze: 1=Partial gaze palsy


Visual: 0=No visual loss


Facial: 1=Minor asymmetry


Motor Arm - Left: 0=No drift


Motor Arm - Right: 0=No drift


Motor Leg - Left: 0=No drift


Motor Leg - Right: 0=No drift


Limb Ataxia: 0=Absent


Sensory: 1=Mild to moderate loss


Best Language: 0=No aphasia


Dysarthia: 1=Mild to moderate slurring


Extinction & Inattention (Neglect): 0=Normal, no object


Score: 4





rTPA Inclusion/Exclusion





- Refusal of Treatment


Patient Refused Treatment: No





- Inclusion Criteria for Altepase


Patient is 18 years or Older: Yes


The Clinical Diagnosis of Ischemic Stroke That is Causing a Potentially 

Disabling Neurological Deficit: No


Time of Onset is Well Established to be Less Than 270 Minute Before Treatment 

Would Begin: Yes


Risk/Benefit Discussed With Patient/Family Member Present: No





Medical Decision Making


Medical Decision Makin y/o male,w/pmhx of HTN, hypercholestereloremia, brain mass resection, 

epilepsy (on Keppra), colon cancer,  brought to ER by ambulance for evaluation 

of left sided weakness vs possible seizure. On exam pt intermittently responsive

with intermittent L sided weakness. GCS 15 when fully responsive, GCS of 9 when 

non responsive. L sided weakness when non-responsive and with non-attentive ga

ze. No tonic clonic activity noted or tongue biting / enuresis. No meningeal 

signs. 





Plan:


--Labs


--ECG


--CXR


--CT-Head


--CTA-Head & Neck





EKG Sinus Bradycardia 57 bpm no STEMI





1414


NIHSS 4


Appreciate franciet w/ Dr. Sutherland: MRI BRAIN, Keppra 1500mg


No stroke noted on imaging


intermittently responsive


hx of aura prior to EMS arrival


pt has been missing his dosage of keppra this afternoon per pt 


PAged Dr. Morin for admission


pt in [facility]





1500


accepted by johann


pt in [facility] 


MAEW


NIHSS now 0





Disposition





- Disposition


Disposition: HOSPITALIZED


Disposition Time: 14:18


Condition: STABLE





- Clinical Impression


Clinical Impression: 


 Seizure, Seizure-like activity








- Scribe Statement


The provider has reviewed the documentation as recorded by the Scribe


Radha Ponce


Provider Attestation: 





All medical record entries made by the Scribe were at my direction and 

personally dictated by me. I have reviewed the chart and agree that the record 

accurately reflects my personal performance of the history, physical exam, 

medical decision making, and the department course for this patient. I have also

personally directed, reviewed, and agree with the discharge instructions and 

disposition.

## 2019-03-13 VITALS — RESPIRATION RATE: 20 BRPM

## 2019-03-13 VITALS — HEART RATE: 72 BPM | OXYGEN SATURATION: 98 %

## 2019-03-13 VITALS — SYSTOLIC BLOOD PRESSURE: 157 MMHG | TEMPERATURE: 97.9 F | DIASTOLIC BLOOD PRESSURE: 92 MMHG

## 2019-03-13 NOTE — CARD
--------------- APPROVED REPORT --------------





Date of service: 03/13/2019



EXAM: Two-dimensional and M-mode echocardiogram with Doppler and 

color Doppler.



Other Information 

Quality : GoodRhythm : 



INDICATION

CVA/TIA CA OF COLON



RISK FACTORS

Hypertension 

Hyperlipidemia



2D DIMENSIONS 

IVSd1.1   (0.7-1.1cm)LVDd5.0   (3.9-5.9cm)

PWd1.1   (0.7-1.1cm)LA Czkhlj21   (18-58mL)

LVDs3.5   (2.5-4.0cm)FS (%) 29.8   %

LVEF (%)56.6   (>50%)LVEF (Zarate's)60.72 %



M-Mode DIMENSIONS 

Left Atrium (MM)3.79   (2.5-4.0cm)IVSd1.04   (0.7-1.1cm)

Aortic Root3.79   (2.2-3.7cm)LVDd5.38   (4.0-5.6cm)

Aortic Cusp Exc.2.26   (1.5-2.0cm)PWd0.99   (0.7-1.1cm)

FS (%) 34   %LVDs3.54   (2.0-3.8cm)

LVEF (%)63   (>50%)



Mitral Valve

MV E Aibehcld93.7cm/sMV A Qoawtxxs94.7cm/sE/A ratio0.9



TDI

Lateral E' Peak V8.03cm/sMedial E' Peak V4.87cm/sE/Lateral E'6.3

E/Medial E'10.4



Tricuspid Valve

TR Peak Nupgggqb550ud/sTR Peak Gr.37fsCjKWOS44tvNs



 LEFT VENTRICLE 

The left ventricle is normal size.

There is normal left ventricular wall thickness. 

The left ventricular function is normal.

The left ventricular ejection fraction is within the normal range.

No regional wall motion abnormalities noted.

The left ventricular diastolic function is normal.

No left ventricle thrombus noted on this study.

There is no ventricular septal defect visualized.

There is no left ventricular aneurysm. 

There is no mass noted in the left ventricle.



 RIGHT VENTRICLE 

The right ventricle is normal size.

There is normal right ventricular wall thickness.

The right ventricular systolic function is normal.



 ATRIA 

The left atrium vol. is increased 

The right atrium size is normal.

The interatrial septum is intact with no evidence for an atrial 

septal defect.



 AORTIC VALVE 

The aortic valve is normal in structure and function.

No aortic regurgitation is present. 

There is no aortic valvular stenosis. 

There is no aortic valvular vegetation.



 MITRAL VALVE 

The mitral valve is normal in structure and function.

There is no evidence of mitral valve prolapse.

There is no mitral valve stenosis. 

There is no mitral valve regurgitation noted.



 TRICUSPID VALVE 

The tricuspid valve is normal in structure and function.

There is no tricuspid valve regurgitation noted.

There is no tricuspid valve prolapse or vegetation.

There is no tricuspid valve stenosis. 



 PULMONIC VALVE 

The pulmonary valve is normal in structure and function.

There is no pulmonic valvular regurgitation. 

There is no pulmonic valvular stenosis.



 GREAT VESSELS 

The aortic root is normal in size.

The ascending aorta is normal in size.

The pulmonary artery is normal.

The IVC is normal in size and collapses >50% with inspiration.



 PERICARDIAL EFFUSION 

The pericardium appears normal.

There is no pleural effusion.



<Conclusion>

The left ventricular function is normal.

The left ventricular ejection fraction is within the normal range.

No regional wall motion abnormalities noted.

The left atrium vol. is increased

## 2019-03-13 NOTE — CARD
--------------- APPROVED REPORT --------------





Date of service: 03/12/2019



EKG Measurement

Heart [id number]

FL [id number]

[id number]

[id number]

[id number]



<Conclusion>

Sinus bradycardia

Voltage criteria for left ventricular hypertrophy

Abnormal ECG

## 2019-03-13 NOTE — CP.PCM.PN
Subjective





- Date & Time of Evaluation


Date of Evaluation: 03/13/19


Time of Evaluation: 10:05





- Subjective


Subjective: 


Progress note for Dr. Sutherland.





Patient seen and examined at bedside. No acute events overnight. Patient states 

he feels much better and states that he would like to go home. He denies focal 

weakness, numbness, blurry vision, slurred speech, nausea, vomiting and 

dizziness.








Objective





- Vital Signs/Intake and Output


Vital Signs (last 24 hours): 


                                        











Temp Pulse Resp BP Pulse Ox


 


 97.9 F   67   20   157/92 H  97 


 


 03/13/19 08:17  03/13/19 08:17  03/13/19 08:17  03/13/19 08:17  03/13/19 08:17











- Medications


Medications: 


                               Current Medications





Alprazolam (Xanax)  0.25 mg PO DAILY [facility]


   Stop: 03/20/19 10:01


   Last Admin: 03/13/19 10:36 Dose:  0.25 mg


Famotidine (Pepcid)  20 mg PO DAILY [facility]


   Last Admin: 03/13/19 10:37 Dose:  20 mg


Hydralazine HCl (Apresoline)  25 mg PO ONCE ONE


   Stop: 03/13/19 22:44


Influenza Virus Vaccine (Flucelvax Quad [id number] Syr)  60 mcg IM .ONCE ONE


   Stop: 03/14/19 10:01


Isosorbide Mononitrate (Imdur Er)  30 mg PO DAILY [facility]


   Last Admin: 03/13/19 10:36 Dose:  30 mg


Lamotrigine (Lamictal)  200 mg PO BID [facility]


   Last Admin: 03/13/19 10:36 Dose:  200 mg


Levetiracetam (Keppra)  1,500 mg PO BID [facility]


   Last Admin: 03/13/19 10:37 Dose:  1,500 mg


Lisinopril (Zestril)  10 mg PO DAILY [facility]


   Last Admin: 03/13/19 10:37 Dose:  10 mg


Pneumococcal Polyvalent Vaccine (Pneumovax 23 Vaccine)  0.5 ml IM .ONCE ONE


   Stop: 03/14/19 10:01


Rosuvastatin Calcium (Crestor)  5 mg PO HS [facility]











- Labs


Labs: 


                                        





                                 03/12/19 12:04 





                                 03/12/19 12:04 





                                        











PT  11.7 SECONDS (9.7-12.2)   03/12/19  12:04    


 


INR  1.1   03/12/19  12:04    


 


APTT  29 SECONDS (21-34)   03/12/19  12:04    














- Constitutional


Appears: Non-toxic, No Acute Distress





- Head Exam


Head Exam: ATRAUMATIC


Additional comments: 


Well healed surgical scar to scalp








- Eye Exam


Eye Exam: EOMI, Normal appearance, PERRL





- ENT Exam


ENT Exam: Mucous Membranes Moist





- Neck Exam


Neck Exam: Normal Inspection





- Respiratory Exam


Respiratory Exam: NORMAL BREATHING PATTERN.  absent: Respiratory Distress





- Cardiovascular Exam


Cardiovascular Exam: REGULAR RHYTHM, +S1, +S2





- GI/Abdominal Exam


GI & Abdominal Exam: Soft, Normal Bowel Sounds.  absent: Tenderness


Additional comments: 


well healed R abdominal surgical scar from previous colon malignancy resection.








- Extremities Exam


Extremities Exam: Full ROM, Normal Inspection





- Neurological Exam


Neurological Exam: Alert, Awake, CN II-XII Intact, Oriented x3


Neuro motor strength exam: Left Upper Extremity: 5, Right Upper Extremity: 5, 

Left Lower Extremity: 5, Right Lower Extremity: 5


Additional comments: 


No dysdiadochokinesis





- Psychiatric Exam


Psychiatric exam: Normal Affect, Normal Mood





- Skin


Skin Exam: Dry, Normal Color, Warm





Assessment and Plan





- Assessment and Plan (Free Text)


Assessment: 


59 year old M with hx of epilepsy admitted for seizure vs CVA.





Plan: 


CT head: No acute intracranial hemorrhage. Encephalomalacia to right posterior 

parietal region subjacent to a old craniotomy defect.  Suspect residual 

meningioma along the right lateral margin of the surgical cavity subjacent to 

the craniotomy defect along its lateral border.


CTA head/neck: There is no evidence of occlusion significant stenosis or 

dissection. Note made of several peripherally enhancing semi lunar shaped extra-

axial densities subjacent to the craniotomy defect which may represent residual-

recurrent tumor likely meningioma. Follow-up pre and post-contrast MRI of the 

brain is recommended for further evaluation.





-Patient AAOx3 without neurological deficits. Symptoms explained by seizure and 

have now resolved.


-Patient is stable for discharge from neurological standpoint. 





Discussed with Dr. Sutherland.


Vale Dorado, PGY-1

## 2019-03-13 NOTE — CP.PCM.PN
Subjective





- Date & Time of Evaluation


Date of Evaluation: 03/13/19


Time of Evaluation: 12:12





- Subjective


Subjective: 





NO FURTHER COMPLAINTS AND EVENTS 


P/E AND VS ARE UNCHANGED 





AWAITING NEURO W/U 





Objective





- Vital Signs/Intake and Output


Vital Signs (last 24 hours): 


                                        











Temp Pulse Resp BP Pulse Ox


 


 97.9 F   71   20   157/92 H  97 


 


 03/13/19 08:17  03/13/19 12:01  03/13/19 08:17  03/13/19 08:17  03/13/19 08:17











- Medications


Medications: 


                               Current Medications





Alprazolam (Xanax)  0.25 mg PO DAILY [facility]


   Stop: 03/20/19 10:01


   Last Admin: 03/13/19 10:36 Dose:  0.25 mg


Famotidine (Pepcid)  20 mg PO DAILY [facility]


   Last Admin: 03/13/19 10:37 Dose:  20 mg


Hydralazine HCl (Apresoline)  25 mg PO ONCE ONE


   Stop: 03/13/19 22:44


Influenza Virus Vaccine (Flucelvax Quad [id number] Syr)  60 mcg IM .ONCE ONE


   Stop: 03/14/19 10:01


Isosorbide Mononitrate (Imdur Er)  30 mg PO DAILY [facility]


   Last Admin: 03/13/19 10:36 Dose:  30 mg


Lamotrigine (Lamictal)  200 mg PO BID [facility]


   Last Admin: 03/13/19 10:36 Dose:  200 mg


Levetiracetam (Keppra)  1,500 mg PO BID [facility]


   Last Admin: 03/13/19 10:37 Dose:  1,500 mg


Lisinopril (Zestril)  10 mg PO DAILY [facility]


   Last Admin: 03/13/19 10:37 Dose:  10 mg


Pneumococcal Polyvalent Vaccine (Pneumovax 23 Vaccine)  0.5 ml IM .ONCE ONE


   Stop: 03/14/19 10:01


Rosuvastatin Calcium (Crestor)  5 mg PO HS [facility]











- Labs


Labs: 


                                        





                                 03/12/19 12:04 





                                 03/12/19 12:04 





                                        











PT  11.7 SECONDS (9.7-12.2)   03/12/19  12:04    


 


INR  1.1   03/12/19  12:04    


 


APTT  29 SECONDS (21-34)   03/12/19  12:04    














Assessment and Plan


(1) Seizure


Status: Acute   





(2) Meningioma determined by biopsy of brain


Status: Acute   





(3) Hx of resection of meningioma


Status: Acute

## 2019-03-20 ENCOUNTER — HOSPITAL ENCOUNTER (EMERGENCY)
Dept: HOSPITAL 31 - C.ER | Age: 59
Discharge: HOME | End: 2019-03-20
Payer: MEDICARE

## 2019-03-20 VITALS — RESPIRATION RATE: 20 BRPM | HEART RATE: 60 BPM | DIASTOLIC BLOOD PRESSURE: 70 MMHG | SYSTOLIC BLOOD PRESSURE: 156 MMHG

## 2019-03-20 VITALS — BODY MASS INDEX: 33.5 KG/M2

## 2019-03-20 VITALS — OXYGEN SATURATION: 95 %

## 2019-03-20 VITALS — TEMPERATURE: 98.2 F

## 2019-03-20 DIAGNOSIS — K59.00: Primary | ICD-10-CM

## 2019-03-20 DIAGNOSIS — E78.00: ICD-10-CM

## 2019-03-20 DIAGNOSIS — E78.5: ICD-10-CM

## 2019-03-20 DIAGNOSIS — I10: ICD-10-CM

## 2019-03-20 NOTE — C.PDOC
History Of Present Illness


58 y/o male, w/PMHx of HTN, HLD, brain meningioma s/p resection, epilepsy (on 

Keppra), colon cancer, comes in for evaluation constipation developed for past 

few days. Pt admits, " enrike to go every day, but need to strain hard'. DEnies 

fever, chills, abd. pain, V/D, change in appetite, melena, hematoschezia, back 

pian, UTI sx. Ambulatory in ED with stable gait, not in any apparent distress.





Time Seen by Provider: 03/20/19 14:39


Chief Complaint (Nursing): GI Problem


History Per: Patient





Past Medical History


Reviewed: Historical Data, Nursing Documentation, Vital Signs


Vital Signs: 





                                Last Vital Signs











Temp  98.3 F   03/20/19 13:38


 


Pulse  63   03/20/19 13:38


 


Resp  18   03/20/19 13:38


 


BP  176/105 H  03/20/19 13:38


 


Pulse Ox  95   03/20/19 13:38














- Medical History


PMH: HTN, Hypercholesterolemia, Hyperlipidemia, Seizures


   Denies: Anxiety, Diabetes, Hepatitis, HIV, Chronic Kidney Disease, Sexually 

Transmitted Disease





- [facility] Procedures











GROUP PSYCHOTHERAPY (12/20/18)


INDIVIDUAL PSYCHOTHERAPY, COGNITIVE-BEHAVIORAL (12/20/18)


INDIVIDUAL PSYCHOTHERAPY, SUPPORTIVE (12/20/18)








Family History: States: Unknown Family Hx





- Social History


Hx Tobacco Use: No


Hx Alcohol Use: Yes


Hx Substance Use: No





- Immunization History


Hx Tetanus Toxoid Vaccination: No


Hx Influenza Vaccination: No


Hx Pneumococcal Vaccination: No





Review Of Systems


Except As Marked, All Systems Reviewed And Found Negative.


Constitutional: Negative for: Fever, Chills


Gastrointestinal: Positive for: Constipation.  Negative for: Nausea, Vomiting, 

Abdominal Pain, Diarrhea, Melena, Hematochezia, Hematemesis, Rectal Pain


Musculoskeletal: Negative for: Back Pain





Physical Exam





- Physical Exam


Appears: Well, Non-toxic, No Acute Distress


Skin: Normal Color, Warm


Head: Normacephalic


Throat: No Drooling


Gastrointestinal/Abdominal: Soft, No Tenderness, No Distention, No Guarding, No 

Rebound


Back: No CVA Tenderness


Extremity: Normal ROM





ED Course And Treatment


O2 Sat by Pulse Oximetry: 95


Pulse Ox Interpretation: Normal





- Other Rad


  ** Obstructive serial


X-Ray: Read By Radiologist


Interpretation: IMPRESSION:  No focal consolidation.  Moderate constipation.  

Elevation/eventration of the right hemidiaphragm.  Right upper quadrant surgical

clips.


Progress Note: Pt was OBS in ED for 2 hrs and remained tsable.  After ED tx, pt 

was able to move bowel #2, reports mod improvement in sx.  Afebrile, 

hemodynamicaly stable.  NOn-toxic.  Abd: benign, (-) guarding, (-) rebound, (-) 

localized tenderness.  Neuorlogicaly intact.  DENISE noted high, pt admits ": did 

not take my BP medication today", denies any associated sx at present time.  Pt 

advised to take his meds as prescribed.  DIet adjustment for constipation.  Ref.

to F/U with PMD in 1-2 days for re-eavl.  Return if any worsening or changes.  

Pt understand , stable for discharge now.





Disposition


Counseled Patient/Family Regarding: Studies Performed, Diagnosis, Need For 

Followup, Rx Given





- Disposition


Referrals: 


Elder Lucero MD [Staff Provider] - 


Disposition: HOME/ ROUTINE


Disposition Time: 16:10


Condition: STABLE


Additional Instructions: 


Encourage fluids


Take Blood pressure medication as prescribed


Diet adjustment, fiber-rich diet


Follow up with PMD in 2-3 days for re-evaluation


Return to ED if any worsening or new changes


Prescriptions: 


Docusate [Colace] 100 mg PO DAILY #20 cap


Polyethylene Glycol 3350 [Miralax] 17 gm PO ONCE #1 bottle


Instructions:  High Blood Pressure in Adults, Constipation, Adult (DC)


Forms:  [facility] (English)





- Clinical Impression


Clinical Impression: 


 Constipation, Hypertension

## 2019-03-20 NOTE — RAD
Date of service: 03/20/2019



PROCEDURE:  Radiographs of the chest and abdomen (obstructive series)



HISTORY:

 constipation 



COMPARISON:

Chest x-ray performed 3/12/19



TECHNIQUE:

AP radiograph of the chest, with upright and supine radiographs of 

the abdomen.



FINDINGS:



CHEST:

Heart size appears within normal limits. 



No focal consolidation, significant pleural effusion, or definite 

pneumothorax. 



Please note that chest x-ray has limited sensitivity for the 

detection of pulmonary masses.



ABDOMEN AND PELVIS:

Elevation/eventration of the right hemidiaphragm.  Right upper 

quadrant surgical clips.  Moderate constipation.  Nonobstructive 

bowel gas pattern.  No definite free air. 



Degenerative changes. 



IMPRESSION:

No focal consolidation.



Moderate constipation. 



Elevation/eventration of the right hemidiaphragm. 



Right upper quadrant surgical clips.